# Patient Record
Sex: FEMALE | Race: WHITE | Employment: UNEMPLOYED | URBAN - METROPOLITAN AREA
[De-identification: names, ages, dates, MRNs, and addresses within clinical notes are randomized per-mention and may not be internally consistent; named-entity substitution may affect disease eponyms.]

---

## 2017-01-11 ENCOUNTER — OFFICE VISIT (OUTPATIENT)
Dept: INTERNAL MEDICINE | Age: 72
End: 2017-01-11

## 2017-01-11 VITALS
DIASTOLIC BLOOD PRESSURE: 98 MMHG | BODY MASS INDEX: 25.11 KG/M2 | HEIGHT: 61 IN | SYSTOLIC BLOOD PRESSURE: 160 MMHG | WEIGHT: 133 LBS

## 2017-01-11 DIAGNOSIS — B34.9 VIRAL SYNDROME: ICD-10-CM

## 2017-01-11 DIAGNOSIS — Q60.5 CONGENITAL RENAL AGENESIS AND DYSGENESIS: ICD-10-CM

## 2017-01-11 DIAGNOSIS — Q60.2 CONGENITAL RENAL AGENESIS AND DYSGENESIS: ICD-10-CM

## 2017-01-11 DIAGNOSIS — R10.31 RIGHT LOWER QUADRANT ABDOMINAL PAIN: ICD-10-CM

## 2017-01-11 DIAGNOSIS — G89.29 CHRONIC BILATERAL LOW BACK PAIN WITHOUT SCIATICA: ICD-10-CM

## 2017-01-11 DIAGNOSIS — M54.50 CHRONIC BILATERAL LOW BACK PAIN WITHOUT SCIATICA: ICD-10-CM

## 2017-01-11 PROCEDURE — 99214 OFFICE O/P EST MOD 30 MIN: CPT | Performed by: INTERNAL MEDICINE

## 2017-01-11 ASSESSMENT — ENCOUNTER SYMPTOMS
WHEEZING: 0
COLOR CHANGE: 0
NAUSEA: 0
BACK PAIN: 1
CONSTIPATION: 1
CHEST TIGHTNESS: 0
ABDOMINAL PAIN: 1
VOMITING: 0
DIARRHEA: 1

## 2017-01-31 ENCOUNTER — HOSPITAL ENCOUNTER (OUTPATIENT)
Dept: GENERAL RADIOLOGY | Age: 72
Discharge: HOME | End: 2017-01-31
Attending: INTERNAL MEDICINE | Admitting: INTERNAL MEDICINE

## 2017-01-31 DIAGNOSIS — R53.83 OTHER FATIGUE: ICD-10-CM

## 2017-01-31 DIAGNOSIS — Z13.820 ENCOUNTER FOR IMAGING TO ASSESS OSTEOPOROSIS: ICD-10-CM

## 2017-01-31 DIAGNOSIS — Z12.31 VISIT FOR SCREENING MAMMOGRAM: ICD-10-CM

## 2017-06-02 ENCOUNTER — TELEPHONE (OUTPATIENT)
Dept: INTERNAL MEDICINE | Age: 72
End: 2017-06-02

## 2017-06-07 ENCOUNTER — TELEPHONE (OUTPATIENT)
Dept: INTERNAL MEDICINE | Age: 72
End: 2017-06-07

## 2017-06-07 RX ORDER — METAXALONE 800 MG/1
800 TABLET ORAL 3 TIMES DAILY PRN
Qty: 60 TABLET | Refills: 3 | Status: SHIPPED | OUTPATIENT
Start: 2017-06-07 | End: 2018-03-21 | Stop reason: SDUPTHER

## 2017-06-15 ENCOUNTER — OFFICE VISIT (OUTPATIENT)
Dept: INTERNAL MEDICINE | Age: 72
End: 2017-06-15

## 2017-06-15 VITALS
HEIGHT: 61 IN | DIASTOLIC BLOOD PRESSURE: 80 MMHG | SYSTOLIC BLOOD PRESSURE: 120 MMHG | BODY MASS INDEX: 24.92 KG/M2 | WEIGHT: 132 LBS

## 2017-06-15 DIAGNOSIS — J45.20 MILD INTERMITTENT ASTHMA WITHOUT COMPLICATION: ICD-10-CM

## 2017-06-15 DIAGNOSIS — S72.002G: ICD-10-CM

## 2017-06-15 DIAGNOSIS — Z23 NEED FOR PNEUMOCOCCAL VACCINE: ICD-10-CM

## 2017-06-15 DIAGNOSIS — S72.009D: ICD-10-CM

## 2017-06-15 DIAGNOSIS — M51.9 LUMBAR DISC DISEASE: ICD-10-CM

## 2017-06-15 DIAGNOSIS — C18.2 MALIGNANT NEOPLASM OF ASCENDING COLON (HCC): ICD-10-CM

## 2017-06-15 DIAGNOSIS — M81.0 OSTEOPOROSIS: Primary | ICD-10-CM

## 2017-06-15 PROCEDURE — 99214 OFFICE O/P EST MOD 30 MIN: CPT | Performed by: INTERNAL MEDICINE

## 2017-06-15 PROCEDURE — 1123F ACP DISCUSS/DSCN MKR DOCD: CPT | Performed by: INTERNAL MEDICINE

## 2017-06-15 PROCEDURE — 3014F SCREEN MAMMO DOC REV: CPT | Performed by: INTERNAL MEDICINE

## 2017-06-15 PROCEDURE — G8399 PT W/DXA RESULTS DOCUMENT: HCPCS | Performed by: INTERNAL MEDICINE

## 2017-06-15 PROCEDURE — 4005F PHARM THX FOR OP RXD: CPT | Performed by: INTERNAL MEDICINE

## 2017-06-15 PROCEDURE — 1090F PRES/ABSN URINE INCON ASSESS: CPT | Performed by: INTERNAL MEDICINE

## 2017-06-15 PROCEDURE — G8427 DOCREV CUR MEDS BY ELIG CLIN: HCPCS | Performed by: INTERNAL MEDICINE

## 2017-06-15 PROCEDURE — 1036F TOBACCO NON-USER: CPT | Performed by: INTERNAL MEDICINE

## 2017-06-15 PROCEDURE — G8420 CALC BMI NORM PARAMETERS: HCPCS | Performed by: INTERNAL MEDICINE

## 2017-06-15 PROCEDURE — 90670 PCV13 VACCINE IM: CPT | Performed by: INTERNAL MEDICINE

## 2017-06-15 PROCEDURE — G0009 ADMIN PNEUMOCOCCAL VACCINE: HCPCS | Performed by: INTERNAL MEDICINE

## 2017-06-15 PROCEDURE — 4040F PNEUMOC VAC/ADMIN/RCVD: CPT | Performed by: INTERNAL MEDICINE

## 2017-06-15 PROCEDURE — 3017F COLORECTAL CA SCREEN DOC REV: CPT | Performed by: INTERNAL MEDICINE

## 2017-06-15 ASSESSMENT — PATIENT HEALTH QUESTIONNAIRE - PHQ9
1. LITTLE INTEREST OR PLEASURE IN DOING THINGS: 0
SUM OF ALL RESPONSES TO PHQ9 QUESTIONS 1 & 2: 0
2. FEELING DOWN, DEPRESSED OR HOPELESS: 0
SUM OF ALL RESPONSES TO PHQ QUESTIONS 1-9: 0

## 2017-06-24 PROBLEM — S72.009A CLOSED FRACTURE OF HIP (HCC): Status: ACTIVE | Noted: 2017-06-24

## 2017-06-24 ASSESSMENT — ENCOUNTER SYMPTOMS
BACK PAIN: 0
COLOR CHANGE: 0
CHEST TIGHTNESS: 0
WHEEZING: 0
ABDOMINAL PAIN: 0

## 2017-07-29 PROBLEM — R03.0 ELEVATED BLOOD PRESSURE READING: Status: RESOLVED | Noted: 2017-01-11 | Resolved: 2017-06-24

## 2018-03-21 ENCOUNTER — OFFICE VISIT (OUTPATIENT)
Dept: INTERNAL MEDICINE | Age: 73
End: 2018-03-21

## 2018-03-21 ENCOUNTER — HOSPITAL ENCOUNTER (OUTPATIENT)
Dept: MAMMOGRAPHY | Age: 73
Discharge: HOME | End: 2018-03-21
Attending: INTERNAL MEDICINE | Admitting: INTERNAL MEDICINE

## 2018-03-21 VITALS
SYSTOLIC BLOOD PRESSURE: 150 MMHG | WEIGHT: 137 LBS | HEIGHT: 61 IN | DIASTOLIC BLOOD PRESSURE: 100 MMHG | BODY MASS INDEX: 25.86 KG/M2

## 2018-03-21 DIAGNOSIS — R92.8 ABNORMAL MAMMOGRAM: ICD-10-CM

## 2018-03-21 DIAGNOSIS — Q60.5 CONGENITAL RENAL AGENESIS AND DYSGENESIS: ICD-10-CM

## 2018-03-21 DIAGNOSIS — M51.9 LUMBAR DISC DISEASE: ICD-10-CM

## 2018-03-21 DIAGNOSIS — Z20.6 HIV EXPOSURE: ICD-10-CM

## 2018-03-21 DIAGNOSIS — J45.20 MILD INTERMITTENT ASTHMA WITHOUT COMPLICATION: ICD-10-CM

## 2018-03-21 DIAGNOSIS — E78.00 PURE HYPERCHOLESTEROLEMIA: ICD-10-CM

## 2018-03-21 DIAGNOSIS — G25.0 BENIGN ESSENTIAL TREMOR: ICD-10-CM

## 2018-03-21 DIAGNOSIS — C18.2 MALIGNANT NEOPLASM OF ASCENDING COLON (HCC): ICD-10-CM

## 2018-03-21 DIAGNOSIS — Z00.00 ROUTINE GENERAL MEDICAL EXAMINATION AT A HEALTH CARE FACILITY: ICD-10-CM

## 2018-03-21 DIAGNOSIS — Z00.00 WELL ADULT EXAM: Primary | ICD-10-CM

## 2018-03-21 DIAGNOSIS — L98.9 SKIN LESIONS: ICD-10-CM

## 2018-03-21 DIAGNOSIS — Z12.31 VISIT FOR SCREENING MAMMOGRAM: ICD-10-CM

## 2018-03-21 DIAGNOSIS — C44.90 MALIGNANT NEOPLASM OF SKIN: ICD-10-CM

## 2018-03-21 DIAGNOSIS — Q60.2 CONGENITAL RENAL AGENESIS AND DYSGENESIS: ICD-10-CM

## 2018-03-21 DIAGNOSIS — S72.009D CLOSED FRACTURE OF HIP WITH ROUTINE HEALING, UNSPECIFIED LATERALITY, SUBSEQUENT ENCOUNTER: ICD-10-CM

## 2018-03-21 DIAGNOSIS — R53.82 CHRONIC FATIGUE: ICD-10-CM

## 2018-03-21 DIAGNOSIS — R92.8 ABNORMAL FINDING ON MAMMOGRAPHY: ICD-10-CM

## 2018-03-21 PROBLEM — R10.31 RIGHT LOWER QUADRANT ABDOMINAL PAIN: Status: RESOLVED | Noted: 2017-01-11 | Resolved: 2018-03-21

## 2018-03-21 PROCEDURE — 99214 OFFICE O/P EST MOD 30 MIN: CPT | Performed by: INTERNAL MEDICINE

## 2018-03-21 PROCEDURE — G8484 FLU IMMUNIZE NO ADMIN: HCPCS | Performed by: INTERNAL MEDICINE

## 2018-03-21 PROCEDURE — G8399 PT W/DXA RESULTS DOCUMENT: HCPCS | Performed by: INTERNAL MEDICINE

## 2018-03-21 PROCEDURE — 3017F COLORECTAL CA SCREEN DOC REV: CPT | Performed by: INTERNAL MEDICINE

## 2018-03-21 PROCEDURE — G8419 CALC BMI OUT NRM PARAM NOF/U: HCPCS | Performed by: INTERNAL MEDICINE

## 2018-03-21 PROCEDURE — 1090F PRES/ABSN URINE INCON ASSESS: CPT | Performed by: INTERNAL MEDICINE

## 2018-03-21 PROCEDURE — 1123F ACP DISCUSS/DSCN MKR DOCD: CPT | Performed by: INTERNAL MEDICINE

## 2018-03-21 PROCEDURE — 1036F TOBACCO NON-USER: CPT | Performed by: INTERNAL MEDICINE

## 2018-03-21 PROCEDURE — G8427 DOCREV CUR MEDS BY ELIG CLIN: HCPCS | Performed by: INTERNAL MEDICINE

## 2018-03-21 PROCEDURE — 4040F PNEUMOC VAC/ADMIN/RCVD: CPT | Performed by: INTERNAL MEDICINE

## 2018-03-21 PROCEDURE — 3014F SCREEN MAMMO DOC REV: CPT | Performed by: INTERNAL MEDICINE

## 2018-03-21 PROCEDURE — G0438 PPPS, INITIAL VISIT: HCPCS | Performed by: INTERNAL MEDICINE

## 2018-03-21 RX ORDER — FLUTICASONE PROPIONATE 110 UG/1
AEROSOL, METERED RESPIRATORY (INHALATION)
Qty: 1 INHALER | Refills: 5 | Status: SHIPPED | OUTPATIENT
Start: 2018-03-21 | End: 2019-09-17 | Stop reason: SDUPTHER

## 2018-03-21 RX ORDER — FLUTICASONE PROPIONATE 50 MCG
SPRAY, SUSPENSION (ML) NASAL
Qty: 1 BOTTLE | Refills: 5 | Status: SHIPPED | OUTPATIENT
Start: 2018-03-21 | End: 2019-03-26 | Stop reason: SDUPTHER

## 2018-03-21 RX ORDER — ALBUTEROL SULFATE 90 UG/1
2 AEROSOL, METERED RESPIRATORY (INHALATION) EVERY 6 HOURS PRN
Qty: 1 INHALER | Refills: 3 | Status: SHIPPED | OUTPATIENT
Start: 2018-03-21 | End: 2018-10-09 | Stop reason: SDUPTHER

## 2018-03-21 RX ORDER — METAXALONE 400 MG/1
200 TABLET ORAL 3 TIMES DAILY PRN
COMMUNITY
End: 2018-03-21

## 2018-03-21 RX ORDER — METAXALONE 800 MG/1
800 TABLET ORAL 3 TIMES DAILY PRN
Qty: 30 TABLET | Refills: 5 | Status: SHIPPED | OUTPATIENT
Start: 2018-03-21 | End: 2019-07-25 | Stop reason: SDUPTHER

## 2018-03-21 ASSESSMENT — ENCOUNTER SYMPTOMS
COLOR CHANGE: 0
ABDOMINAL PAIN: 0
WHEEZING: 0
CHEST TIGHTNESS: 0
BACK PAIN: 0

## 2018-03-21 NOTE — PROGRESS NOTES
Subjective:      Patient ID: Odin Latif is a 71 y.o. female. In for physical. Has had some hearing loss in left ear. Still has some anxiety/stress related to financial issues. Hoping to get out and walk once weather better as has gained some weight over the winter. To hopefully get a part time job soon. utd w colon ca MD. still using premarin cream off and on and no recurrent uti's recently. Asthma controlled w current inhalers and not using rescue inhaler. Left hip doing well after fracture w minimal pain. Has slight hand tremor worse today w stress of mammogram incident. Back still bothers her off and on but not doing exercises    Review of Systems   Constitutional: Negative for activity change, appetite change and fatigue. HENT: Positive for hearing loss. Negative for congestion. Eyes: Negative for visual disturbance. Respiratory: Negative for chest tightness and wheezing. Cardiovascular: Negative for chest pain and palpitations. Gastrointestinal: Negative for abdominal pain. Musculoskeletal: Negative for arthralgias and back pain. Skin: Negative for color change and rash. Neurological: Negative for weakness, light-headedness and headaches. Psychiatric/Behavioral: Positive for sleep disturbance. Negative for dysphoric mood. The patient is not nervous/anxious. Objective:   Physical Exam   Constitutional: She is oriented to person, place, and time. She appears well-developed and well-nourished. HENT:   Head: Normocephalic and atraumatic. Right Ear: External ear normal.   Left Ear: External ear normal.   Nose: Nose normal.   Mouth/Throat: Oropharynx is clear and moist.   Eyes: Conjunctivae and EOM are normal. Pupils are equal, round, and reactive to light. Neck: Normal range of motion. Neck supple. No JVD present. Carotid bruit is not present. No thyromegaly present. Cardiovascular: Normal rate, regular rhythm, normal heart sounds, intact distal pulses and normal pulses. Exam reveals no gallop. No murmur heard. No carotid bruit noted bilaterally   Pulmonary/Chest: Effort normal and breath sounds normal. She has no wheezes. She has no rales. Abdominal: Soft. She exhibits no distension and no mass. There is no splenomegaly or hepatomegaly. There is no tenderness. There is no rebound and no guarding. Genitourinary: No breast swelling, tenderness or discharge. Genitourinary Comments: No breast masses palpable   Musculoskeletal: Normal range of motion. Lymphadenopathy:        Head (right side): No submandibular adenopathy present. Head (left side): No submandibular adenopathy present. She has no cervical adenopathy. Neurological: She is alert and oriented to person, place, and time. She has normal strength and normal reflexes. No cranial nerve deficit. Skin: Skin is warm, dry and intact. No rash noted. Psychiatric: She has a normal mood and affect. Her speech is normal and behavior is normal. Judgment and thought content normal. Cognition and memory are normal.         Assessment and Plan:      Malignant neoplasm of ascending colon ([address]  For repeat colonoscopy in August - cont high fiber diet as much as tolerated     Closed fracture of hip ([address]  Cont to do stretches and strengthening     Congenital renal agenesis and dysgenesis  Follow renal function    Asthma  Controlled w inhalers at this time     Benign essential tremor  Tolerating w/out meds for now    Pure hypercholesterolemia  Recheck and watch diet     Malignant neoplasm of skin  Refer to derm for f/u     Lumbar disc disease  Not doing exercises regularly-encouraged to do them regularly    Complete physical completed. Patient now up to date with all routine screening including Pap and colonoscopy if needed, yearly eye and dental exams,labs, dexa if indicated. Counseled re: nutrition/calcium and vit d supplementation,seat belt use, no cell phone use with driving.         Encounter Diagnoses   Name

## 2018-07-03 ENCOUNTER — TELEPHONE (OUTPATIENT)
Dept: INTERNAL MEDICINE | Age: 73
End: 2018-07-03

## 2018-07-03 DIAGNOSIS — F41.9 ANXIETY: Primary | ICD-10-CM

## 2018-07-03 RX ORDER — DIAZEPAM 5 MG/1
5 TABLET ORAL EVERY 6 HOURS PRN
Qty: 20 TABLET | Refills: 0 | Status: SHIPPED | OUTPATIENT
Start: 2018-07-03 | End: 2018-07-10

## 2018-07-03 NOTE — TELEPHONE ENCOUNTER
Patient is calling for valium because she not able to sleep and she gets stomach cramps  Pt is going through a break up   Please call pt advise

## 2018-09-20 ENCOUNTER — HOSPITAL ENCOUNTER (OUTPATIENT)
Dept: ULTRASOUND IMAGING | Age: 73
Discharge: HOME | End: 2018-09-20
Payer: MEDICARE

## 2018-09-20 DIAGNOSIS — Z09 FOLLOW-UP EXAM, 3-6 MONTHS SINCE PREVIOUS EXAM: ICD-10-CM

## 2018-09-20 PROCEDURE — 76642 ULTRASOUND BREAST LIMITED: CPT

## 2018-10-09 ENCOUNTER — TELEPHONE (OUTPATIENT)
Dept: INTERNAL MEDICINE CLINIC | Age: 73
End: 2018-10-09

## 2018-10-09 RX ORDER — ALBUTEROL SULFATE 90 UG/1
2 AEROSOL, METERED RESPIRATORY (INHALATION) EVERY 6 HOURS PRN
Qty: 1 INHALER | Refills: 5 | Status: SHIPPED | OUTPATIENT
Start: 2018-10-09 | End: 2019-09-17 | Stop reason: SDUPTHER

## 2019-03-07 ENCOUNTER — OFFICE VISIT (OUTPATIENT)
Dept: INTERNAL MEDICINE CLINIC | Age: 74
End: 2019-03-07
Payer: MEDICARE

## 2019-03-07 VITALS
TEMPERATURE: 96.4 F | HEIGHT: 61 IN | DIASTOLIC BLOOD PRESSURE: 80 MMHG | WEIGHT: 135 LBS | SYSTOLIC BLOOD PRESSURE: 130 MMHG | BODY MASS INDEX: 25.49 KG/M2

## 2019-03-07 DIAGNOSIS — E55.9 VITAMIN D DEFICIENCY: ICD-10-CM

## 2019-03-07 DIAGNOSIS — J40 BRONCHITIS: ICD-10-CM

## 2019-03-07 DIAGNOSIS — R53.83 FATIGUE, UNSPECIFIED TYPE: ICD-10-CM

## 2019-03-07 DIAGNOSIS — R01.1 CARDIAC MURMUR: ICD-10-CM

## 2019-03-07 DIAGNOSIS — Z20.6 HIV EXPOSURE: ICD-10-CM

## 2019-03-07 DIAGNOSIS — J45.40 MODERATE PERSISTENT ASTHMA WITHOUT COMPLICATION: Primary | ICD-10-CM

## 2019-03-07 DIAGNOSIS — C18.2 MALIGNANT NEOPLASM OF ASCENDING COLON (HCC): ICD-10-CM

## 2019-03-07 DIAGNOSIS — H91.93 HEARING DISORDER OF BOTH EARS: ICD-10-CM

## 2019-03-07 DIAGNOSIS — E78.00 PURE HYPERCHOLESTEROLEMIA: ICD-10-CM

## 2019-03-07 PROCEDURE — G8419 CALC BMI OUT NRM PARAM NOF/U: HCPCS | Performed by: INTERNAL MEDICINE

## 2019-03-07 PROCEDURE — G8399 PT W/DXA RESULTS DOCUMENT: HCPCS | Performed by: INTERNAL MEDICINE

## 2019-03-07 PROCEDURE — 1036F TOBACCO NON-USER: CPT | Performed by: INTERNAL MEDICINE

## 2019-03-07 PROCEDURE — G8484 FLU IMMUNIZE NO ADMIN: HCPCS | Performed by: INTERNAL MEDICINE

## 2019-03-07 PROCEDURE — 1090F PRES/ABSN URINE INCON ASSESS: CPT | Performed by: INTERNAL MEDICINE

## 2019-03-07 PROCEDURE — 3017F COLORECTAL CA SCREEN DOC REV: CPT | Performed by: INTERNAL MEDICINE

## 2019-03-07 PROCEDURE — 1123F ACP DISCUSS/DSCN MKR DOCD: CPT | Performed by: INTERNAL MEDICINE

## 2019-03-07 PROCEDURE — 4040F PNEUMOC VAC/ADMIN/RCVD: CPT | Performed by: INTERNAL MEDICINE

## 2019-03-07 PROCEDURE — 1101F PT FALLS ASSESS-DOCD LE1/YR: CPT | Performed by: INTERNAL MEDICINE

## 2019-03-07 PROCEDURE — G8428 CUR MEDS NOT DOCUMENT: HCPCS | Performed by: INTERNAL MEDICINE

## 2019-03-07 PROCEDURE — 99214 OFFICE O/P EST MOD 30 MIN: CPT | Performed by: INTERNAL MEDICINE

## 2019-03-07 RX ORDER — METHYLPREDNISOLONE 4 MG/1
TABLET ORAL
Qty: 1 KIT | Refills: 0 | Status: SHIPPED | OUTPATIENT
Start: 2019-03-07 | End: 2019-09-17 | Stop reason: CLARIF

## 2019-03-07 RX ORDER — AZITHROMYCIN 250 MG/1
TABLET, FILM COATED ORAL
Qty: 1 PACKET | Refills: 1 | Status: SHIPPED | OUTPATIENT
Start: 2019-03-07 | End: 2019-03-17

## 2019-03-07 ASSESSMENT — ENCOUNTER SYMPTOMS
COUGH: 1
WHEEZING: 0
SHORTNESS OF BREATH: 1
SINUS PAIN: 0
SORE THROAT: 1
CHEST TIGHTNESS: 1
ABDOMINAL PAIN: 0
SINUS PRESSURE: 0

## 2019-03-07 ASSESSMENT — PATIENT HEALTH QUESTIONNAIRE - PHQ9
2. FEELING DOWN, DEPRESSED OR HOPELESS: 0
1. LITTLE INTEREST OR PLEASURE IN DOING THINGS: 1
SUM OF ALL RESPONSES TO PHQ QUESTIONS 1-9: 1
SUM OF ALL RESPONSES TO PHQ9 QUESTIONS 1 & 2: 1
SUM OF ALL RESPONSES TO PHQ QUESTIONS 1-9: 1

## 2019-03-09 ENCOUNTER — TELEPHONE (OUTPATIENT)
Dept: INTERNAL MEDICINE CLINIC | Age: 74
End: 2019-03-09

## 2019-03-09 RX ORDER — DOXYCYCLINE HYCLATE 100 MG
100 TABLET ORAL 2 TIMES DAILY
Qty: 20 TABLET | Refills: 0 | Status: SHIPPED | OUTPATIENT
Start: 2019-03-09 | End: 2019-03-19

## 2019-03-26 RX ORDER — FLUTICASONE PROPIONATE 50 MCG
SPRAY, SUSPENSION (ML) NASAL
Qty: 16 G | Refills: 1 | Status: SHIPPED | OUTPATIENT
Start: 2019-03-26 | End: 2019-09-17 | Stop reason: SDUPTHER

## 2019-07-25 RX ORDER — METAXALONE 800 MG/1
TABLET ORAL
Qty: 30 TABLET | Refills: 5 | Status: SHIPPED | OUTPATIENT
Start: 2019-07-25 | End: 2020-05-01

## 2019-09-17 ENCOUNTER — OFFICE VISIT (OUTPATIENT)
Dept: INTERNAL MEDICINE CLINIC | Age: 74
End: 2019-09-17
Payer: MEDICARE

## 2019-09-17 VITALS
WEIGHT: 136 LBS | DIASTOLIC BLOOD PRESSURE: 82 MMHG | BODY MASS INDEX: 25.68 KG/M2 | SYSTOLIC BLOOD PRESSURE: 132 MMHG | HEIGHT: 61 IN

## 2019-09-17 DIAGNOSIS — Q60.2 CONGENITAL RENAL AGENESIS AND DYSGENESIS: ICD-10-CM

## 2019-09-17 DIAGNOSIS — C18.2 MALIGNANT NEOPLASM OF ASCENDING COLON (HCC): ICD-10-CM

## 2019-09-17 DIAGNOSIS — F33.0 MILD EPISODE OF RECURRENT MAJOR DEPRESSIVE DISORDER (HCC): ICD-10-CM

## 2019-09-17 DIAGNOSIS — R07.9 CHEST PAIN, UNSPECIFIED TYPE: ICD-10-CM

## 2019-09-17 DIAGNOSIS — M81.0 AGE-RELATED OSTEOPOROSIS WITHOUT CURRENT PATHOLOGICAL FRACTURE: ICD-10-CM

## 2019-09-17 DIAGNOSIS — Z00.00 ROUTINE GENERAL MEDICAL EXAMINATION AT A HEALTH CARE FACILITY: Primary | ICD-10-CM

## 2019-09-17 DIAGNOSIS — G25.0 BENIGN ESSENTIAL TREMOR: ICD-10-CM

## 2019-09-17 DIAGNOSIS — Q60.5 CONGENITAL RENAL AGENESIS AND DYSGENESIS: ICD-10-CM

## 2019-09-17 DIAGNOSIS — E78.00 PURE HYPERCHOLESTEROLEMIA: ICD-10-CM

## 2019-09-17 DIAGNOSIS — R53.82 CHRONIC FATIGUE: ICD-10-CM

## 2019-09-17 DIAGNOSIS — J45.20 MILD INTERMITTENT ASTHMA WITHOUT COMPLICATION: ICD-10-CM

## 2019-09-17 DIAGNOSIS — E55.9 VITAMIN D DEFICIENCY: ICD-10-CM

## 2019-09-17 DIAGNOSIS — M25.559 ARTHRALGIA OF HIP, UNSPECIFIED LATERALITY: ICD-10-CM

## 2019-09-17 PROBLEM — F33.9 EPISODE OF RECURRENT MAJOR DEPRESSIVE DISORDER (HCC): Status: ACTIVE | Noted: 2019-09-17

## 2019-09-17 PROBLEM — J40 BRONCHITIS: Status: RESOLVED | Noted: 2019-03-07 | Resolved: 2019-09-17

## 2019-09-17 PROCEDURE — G0439 PPPS, SUBSEQ VISIT: HCPCS | Performed by: INTERNAL MEDICINE

## 2019-09-17 PROCEDURE — 1123F ACP DISCUSS/DSCN MKR DOCD: CPT | Performed by: INTERNAL MEDICINE

## 2019-09-17 PROCEDURE — G8419 CALC BMI OUT NRM PARAM NOF/U: HCPCS | Performed by: INTERNAL MEDICINE

## 2019-09-17 PROCEDURE — 1036F TOBACCO NON-USER: CPT | Performed by: INTERNAL MEDICINE

## 2019-09-17 PROCEDURE — G8427 DOCREV CUR MEDS BY ELIG CLIN: HCPCS | Performed by: INTERNAL MEDICINE

## 2019-09-17 PROCEDURE — 93000 ELECTROCARDIOGRAM COMPLETE: CPT | Performed by: INTERNAL MEDICINE

## 2019-09-17 PROCEDURE — 3017F COLORECTAL CA SCREEN DOC REV: CPT | Performed by: INTERNAL MEDICINE

## 2019-09-17 PROCEDURE — 4040F PNEUMOC VAC/ADMIN/RCVD: CPT | Performed by: INTERNAL MEDICINE

## 2019-09-17 PROCEDURE — 1090F PRES/ABSN URINE INCON ASSESS: CPT | Performed by: INTERNAL MEDICINE

## 2019-09-17 PROCEDURE — G8399 PT W/DXA RESULTS DOCUMENT: HCPCS | Performed by: INTERNAL MEDICINE

## 2019-09-17 PROCEDURE — 99214 OFFICE O/P EST MOD 30 MIN: CPT | Performed by: INTERNAL MEDICINE

## 2019-09-17 RX ORDER — ALBUTEROL SULFATE 90 UG/1
2 AEROSOL, METERED RESPIRATORY (INHALATION) EVERY 6 HOURS PRN
Qty: 1 INHALER | Refills: 5 | Status: SHIPPED | OUTPATIENT
Start: 2019-09-17 | End: 2021-03-10 | Stop reason: SDUPTHER

## 2019-09-17 RX ORDER — FLUTICASONE PROPIONATE 110 UG/1
AEROSOL, METERED RESPIRATORY (INHALATION)
Qty: 1 INHALER | Refills: 5 | Status: SHIPPED | OUTPATIENT
Start: 2019-09-17 | End: 2021-03-10 | Stop reason: SDUPTHER

## 2019-09-17 RX ORDER — FLUTICASONE PROPIONATE 50 MCG
SPRAY, SUSPENSION (ML) NASAL
Qty: 16 G | Refills: 5 | Status: SHIPPED | OUTPATIENT
Start: 2019-09-17 | End: 2021-03-10 | Stop reason: SDUPTHER

## 2019-09-17 ASSESSMENT — PATIENT HEALTH QUESTIONNAIRE - PHQ9
SUM OF ALL RESPONSES TO PHQ QUESTIONS 1-9: 0

## 2019-09-17 ASSESSMENT — ENCOUNTER SYMPTOMS
BACK PAIN: 0
COLOR CHANGE: 0
ABDOMINAL PAIN: 0
CHEST TIGHTNESS: 0
WHEEZING: 0

## 2019-09-17 ASSESSMENT — LIFESTYLE VARIABLES
HOW OFTEN DO YOU HAVE SIX OR MORE DRINKS ON ONE OCCASION: 0
HOW OFTEN DO YOU HAVE A DRINK CONTAINING ALCOHOL: 3
HOW MANY STANDARD DRINKS CONTAINING ALCOHOL DO YOU HAVE ON A TYPICAL DAY: 0

## 2019-09-17 NOTE — ASSESSMENT & PLAN NOTE
Check renal function-Need to control all risk factors- monitor blood pressure, blood sugars , and lipids carefully and treat aggressively to avoid progression of renal disease

## 2019-09-17 NOTE — PROGRESS NOTES
Medicare Annual Wellness Visit  Name: Ruth Gomez Date: 2019   MRN: [id number] Sex: Female   Age: 69 y.o. Ethnicity: Non-/Non    : 1945 Race: Ira Roque is here for No chief complaint on file. Screenings for behavioral, psychosocial and functional/safety risks, and cognitive dysfunction are all negative except as indicated below. These results, as well as other patient data from the [address] form, are documented in Flowsheets linked to this Encounter. Allergies   Allergen Reactions    Dye [Iodides] Anaphylaxis    Iodine Hives, Shortness Of Breath and Itching    Azithromycin      intolerance    Codeine Nausea And Vomiting     Headache severe     Prior to Visit Medications    Medication Sig Taking? Authorizing Provider   albuterol sulfate HFA (PROAIR HFA) 108 (90 Base) MCG/ACT inhaler Inhale 2 puffs into the lungs every 6 hours as needed for Wheezing Yes Josselyn Dennis MD   fluticasone (FLOVENT HFA) 110 MCG/ACT inhaler INHALE 2 PUFFS TWICE A DAY AS DIRECTED Yes Josselyn Dennis MD   fluticasone (FLONASE) 50 MCG/ACT nasal spray One spray each nostril daily Yes Josselyn Dennis MD   metaxalone (SKELAXIN) 800 MG tablet TAKE 1 TABLET BY MOUTH THREE TIMES DAILY AS NEEDED FOR PAIN Yes Josselyn Dennis MD   conjugated estrogens (PREMARIN) 0.625 MG/GM vaginal cream Place 0.5 g vaginally daily Place vaginally daily. Yes Josselyn Dennis MD   Cholecalciferol (VITAMIN D3) 1000 UNITS CAPS Take 1 capsule by mouth daily Yes Historical Provider, MD   vitamin B-12 (CYANOCOBALAMIN) 500 MCG tablet Take 500 mcg by mouth daily Yes Historical Provider, MD   Multiple Vitamins-Minerals (THERAPEUTIC MULTIVITAMIN-MINERALS) tablet Take 1 tablet by mouth daily. Yes Historical Provider, MD   magnesium gluconate (MAGONATE) 500 MG tablet Take 250 mg by mouth once  Yes Historical Provider, MD   Biotin 1 MG CAPS Take 2 tablets by mouth daily.  Yes Historical Provider, MD   Cranberry 125 kg/m². Based upon direct observation of the patient, evaluation of cognition reveals recent and remote memory intact. Patient's complete Health Risk Assessment and screening values have been reviewed and are found in Flowsheets. The following problems were reviewed today and where indicated follow up appointments were made and/or referrals ordered. Positive Risk Factor Screenings with Interventions:     No Positive Risk Factors identified today. Personalized Preventive Plan   Current Health Maintenance Status  Immunization History   Administered Date(s) Administered    Pneumococcal Conjugate 13-valent ([id number]) 06/15/2017    Pneumococcal Polysaccharide ([id number]) 04/09/2012    Tetanus 05/09/2006        Health Maintenance   Topic Date Due    Hepatitis C screen  1945    Shingles Vaccine (1 of 2) 07/04/1996    DTaP/Tdap/Td vaccine (2 - Tdap) 05/11/2016    Annual Wellness Visit (AWV)  05/29/2019    Flu vaccine (1) 10/31/2019 (Originally 9/1/2019)    Breast cancer screen  03/21/2020    Lipid screen  12/13/2021    Colon cancer screen colonoscopy  10/19/2028    DEXA (modify frequency per FRAX score)  Completed    Pneumococcal 65+ years Vaccine  Completed     Recommendations for Preventive Services Due: see orders and patient instructions/AVS.  . Recommended screening schedule for the next 5-10 years is provided to the patient in written form: see Patient Instructions/AVS.    Diagnoses and all orders for this visit:    Routine general medical examination at a health care facility  -     HEPATITIS C ANTIBODY;  Future  -     EKG 12 Lead    Mild intermittent asthma without complication  -     fluticasone (FLOVENT HFA) 110 MCG/ACT inhaler; INHALE 2 PUFFS TWICE A DAY AS DIRECTED    Pure hypercholesterolemia  -     fluticasone (FLOVENT HFA) 110 MCG/ACT inhaler; INHALE 2 PUFFS TWICE A DAY AS DIRECTED    Benign essential tremor  -     fluticasone (FLOVENT HFA) 110 MCG/ACT inhaler; INHALE 2

## 2019-09-30 ENCOUNTER — OFFICE VISIT (OUTPATIENT)
Dept: PSYCHOLOGY | Age: 74
End: 2019-09-30
Payer: MEDICARE

## 2019-09-30 DIAGNOSIS — F32.A DEPRESSION, UNSPECIFIED DEPRESSION TYPE: Primary | ICD-10-CM

## 2019-09-30 PROCEDURE — 90791 PSYCH DIAGNOSTIC EVALUATION: CPT | Performed by: PSYCHOLOGIST

## 2019-09-30 ASSESSMENT — PATIENT HEALTH QUESTIONNAIRE - PHQ9
SUM OF ALL RESPONSES TO PHQ9 QUESTIONS 1 & 2: 2
SUM OF ALL RESPONSES TO PHQ QUESTIONS 1-9: 2
2. FEELING DOWN, DEPRESSED OR HOPELESS: 1
1. LITTLE INTEREST OR PLEASURE IN DOING THINGS: 1
SUM OF ALL RESPONSES TO PHQ QUESTIONS 1-9: 2

## 2019-09-30 ASSESSMENT — ANXIETY QUESTIONNAIRES
2. NOT BEING ABLE TO STOP OR CONTROL WORRYING: 0-NOT AT ALL SURE
3. WORRYING TOO MUCH ABOUT DIFFERENT THINGS: 0-NOT AT ALL SURE
7. FEELING AFRAID AS IF SOMETHING AWFUL MIGHT HAPPEN: 0-NOT AT ALL SURE
1. FEELING NERVOUS, ANXIOUS, OR ON EDGE: 1-SEVERAL DAYS
5. BEING SO RESTLESS THAT IT IS HARD TO SIT STILL: 0-NOT AT ALL SURE
4. TROUBLE RELAXING: 0-NOT AT ALL SURE
6. BECOMING EASILY ANNOYED OR IRRITABLE: 0-NOT AT ALL SURE
GAD7 TOTAL SCORE: 1

## 2019-10-17 ENCOUNTER — OFFICE VISIT (OUTPATIENT)
Dept: PSYCHOLOGY | Age: 74
End: 2019-10-17
Payer: MEDICARE

## 2019-10-17 DIAGNOSIS — F32.A DEPRESSION, UNSPECIFIED DEPRESSION TYPE: Primary | ICD-10-CM

## 2019-10-17 PROCEDURE — 90832 PSYTX W PT 30 MINUTES: CPT | Performed by: PSYCHOLOGIST

## 2019-10-17 ASSESSMENT — ANXIETY QUESTIONNAIRES
5. BEING SO RESTLESS THAT IT IS HARD TO SIT STILL: 0-NOT AT ALL SURE
2. NOT BEING ABLE TO STOP OR CONTROL WORRYING: 0-NOT AT ALL SURE
4. TROUBLE RELAXING: 0-NOT AT ALL SURE
7. FEELING AFRAID AS IF SOMETHING AWFUL MIGHT HAPPEN: 0-NOT AT ALL SURE
GAD7 TOTAL SCORE: 0
1. FEELING NERVOUS, ANXIOUS, OR ON EDGE: 0-NOT AT ALL SURE
6. BECOMING EASILY ANNOYED OR IRRITABLE: 0-NOT AT ALL SURE
3. WORRYING TOO MUCH ABOUT DIFFERENT THINGS: 0-NOT AT ALL SURE

## 2019-10-17 ASSESSMENT — PATIENT HEALTH QUESTIONNAIRE - PHQ9
2. FEELING DOWN, DEPRESSED OR HOPELESS: 0
SUM OF ALL RESPONSES TO PHQ QUESTIONS 1-9: 0
SUM OF ALL RESPONSES TO PHQ QUESTIONS 1-9: 0
1. LITTLE INTEREST OR PLEASURE IN DOING THINGS: 0
SUM OF ALL RESPONSES TO PHQ9 QUESTIONS 1 & 2: 0

## 2019-12-10 ENCOUNTER — HOSPITAL ENCOUNTER (OUTPATIENT)
Dept: GENERAL RADIOLOGY | Age: 74
Discharge: HOME | End: 2019-12-10
Payer: MEDICARE

## 2019-12-10 DIAGNOSIS — M81.0 AGE-RELATED OSTEOPOROSIS WITHOUT CURRENT PATHOLOGICAL FRACTURE: ICD-10-CM

## 2019-12-10 PROCEDURE — 77080 DXA BONE DENSITY AXIAL: CPT

## 2019-12-17 ENCOUNTER — TELEPHONE (OUTPATIENT)
Dept: ENDOCRINOLOGY | Age: 74
End: 2019-12-17

## 2019-12-17 ENCOUNTER — OFFICE VISIT (OUTPATIENT)
Dept: ENDOCRINOLOGY | Age: 74
End: 2019-12-17
Payer: MEDICARE

## 2019-12-17 VITALS
OXYGEN SATURATION: 98 % | DIASTOLIC BLOOD PRESSURE: 94 MMHG | HEART RATE: 100 BPM | HEIGHT: 61 IN | WEIGHT: 137.8 LBS | SYSTOLIC BLOOD PRESSURE: 165 MMHG | BODY MASS INDEX: 26.01 KG/M2

## 2019-12-17 DIAGNOSIS — I10 HYPERTENSION, UNSPECIFIED TYPE: ICD-10-CM

## 2019-12-17 DIAGNOSIS — M81.0 AGE-RELATED OSTEOPOROSIS WITHOUT CURRENT PATHOLOGICAL FRACTURE: Primary | ICD-10-CM

## 2019-12-17 DIAGNOSIS — E55.9 VITAMIN D DEFICIENCY: ICD-10-CM

## 2019-12-17 PROCEDURE — G8417 CALC BMI ABV UP PARAM F/U: HCPCS | Performed by: INTERNAL MEDICINE

## 2019-12-17 PROCEDURE — G8427 DOCREV CUR MEDS BY ELIG CLIN: HCPCS | Performed by: INTERNAL MEDICINE

## 2019-12-17 PROCEDURE — G8399 PT W/DXA RESULTS DOCUMENT: HCPCS | Performed by: INTERNAL MEDICINE

## 2019-12-17 PROCEDURE — 1036F TOBACCO NON-USER: CPT | Performed by: INTERNAL MEDICINE

## 2019-12-17 PROCEDURE — 1090F PRES/ABSN URINE INCON ASSESS: CPT | Performed by: INTERNAL MEDICINE

## 2019-12-17 PROCEDURE — 99204 OFFICE O/P NEW MOD 45 MIN: CPT | Performed by: INTERNAL MEDICINE

## 2019-12-17 PROCEDURE — G8484 FLU IMMUNIZE NO ADMIN: HCPCS | Performed by: INTERNAL MEDICINE

## 2019-12-17 PROCEDURE — 4040F PNEUMOC VAC/ADMIN/RCVD: CPT | Performed by: INTERNAL MEDICINE

## 2019-12-17 PROCEDURE — 1123F ACP DISCUSS/DSCN MKR DOCD: CPT | Performed by: INTERNAL MEDICINE

## 2019-12-17 PROCEDURE — 3017F COLORECTAL CA SCREEN DOC REV: CPT | Performed by: INTERNAL MEDICINE

## 2019-12-17 RX ORDER — RISEDRONATE SODIUM 35 MG/1
35 TABLET, FILM COATED ORAL
Qty: 4 TABLET | Refills: 5 | Status: SHIPPED | OUTPATIENT
Start: 2019-12-17 | End: 2019-12-31

## 2019-12-20 DIAGNOSIS — M81.0 AGE-RELATED OSTEOPOROSIS WITHOUT CURRENT PATHOLOGICAL FRACTURE: ICD-10-CM

## 2019-12-20 DIAGNOSIS — E55.9 VITAMIN D DEFICIENCY: ICD-10-CM

## 2019-12-20 LAB
24HR URINE VOLUME (ML): 2050 ML
CALCIUM 24 HOUR URINE: 148 MG/24 HR (ref 42–353)
CREATININE 24 HOUR URINE: 0.7 G/24HR (ref 0.6–1.5)

## 2019-12-26 ENCOUNTER — TELEPHONE (OUTPATIENT)
Dept: ENDOCRINOLOGY | Age: 74
End: 2019-12-26

## 2019-12-26 NOTE — TELEPHONE ENCOUNTER
Can we do a  PA? She did not tolerate fosamax.   If not, Dr. Jim Renae discussed prolia with patient and can advise to start prolia

## 2019-12-30 DIAGNOSIS — C18.2 MALIGNANT NEOPLASM OF ASCENDING COLON (HCC): ICD-10-CM

## 2019-12-30 DIAGNOSIS — R53.83 FATIGUE, UNSPECIFIED TYPE: ICD-10-CM

## 2019-12-30 DIAGNOSIS — J45.40 MODERATE PERSISTENT ASTHMA WITHOUT COMPLICATION: ICD-10-CM

## 2019-12-30 DIAGNOSIS — M81.0 AGE-RELATED OSTEOPOROSIS WITHOUT CURRENT PATHOLOGICAL FRACTURE: ICD-10-CM

## 2019-12-30 DIAGNOSIS — H91.93 HEARING DISORDER OF BOTH EARS: ICD-10-CM

## 2019-12-30 DIAGNOSIS — Z20.6 HIV EXPOSURE: ICD-10-CM

## 2019-12-30 DIAGNOSIS — E78.00 PURE HYPERCHOLESTEROLEMIA: ICD-10-CM

## 2019-12-30 DIAGNOSIS — E55.9 VITAMIN D DEFICIENCY: ICD-10-CM

## 2019-12-30 DIAGNOSIS — J40 BRONCHITIS: ICD-10-CM

## 2019-12-30 DIAGNOSIS — R01.1 CARDIAC MURMUR: ICD-10-CM

## 2019-12-30 LAB
A/G RATIO: 1.7 (ref 1.1–2.2)
ALBUMIN SERPL-MCNC: 4.4 G/DL (ref 3.4–5)
ALP BLD-CCNC: 38 U/L (ref 40–129)
ALT SERPL-CCNC: 13 U/L (ref 10–40)
ANION GAP SERPL CALCULATED.3IONS-SCNC: 21 MMOL/L (ref 3–16)
AST SERPL-CCNC: 18 U/L (ref 15–37)
BASOPHILS ABSOLUTE: 0.1 K/UL (ref 0–0.2)
BASOPHILS RELATIVE PERCENT: 0.7 %
BILIRUB SERPL-MCNC: 0.4 MG/DL (ref 0–1)
BUN BLDV-MCNC: 12 MG/DL (ref 7–20)
CALCIUM SERPL-MCNC: 9.7 MG/DL (ref 8.3–10.6)
CEA: 3.1 NG/ML (ref 0–5)
CHLORIDE BLD-SCNC: 102 MMOL/L (ref 99–110)
CHOLESTEROL, TOTAL: 225 MG/DL (ref 0–199)
CO2: 23 MMOL/L (ref 21–32)
CREAT SERPL-MCNC: 0.8 MG/DL (ref 0.6–1.2)
EOSINOPHILS ABSOLUTE: 0.2 K/UL (ref 0–0.6)
EOSINOPHILS RELATIVE PERCENT: 1.8 %
GFR AFRICAN AMERICAN: >60
GFR NON-AFRICAN AMERICAN: >60
GLOBULIN: 2.6 G/DL
GLUCOSE BLD-MCNC: 99 MG/DL (ref 70–99)
HCT VFR BLD CALC: 43.9 % (ref 36–48)
HDLC SERPL-MCNC: 93 MG/DL (ref 40–60)
HEMOGLOBIN: 14.4 G/DL (ref 12–16)
LDL CHOLESTEROL CALCULATED: 112 MG/DL
LYMPHOCYTES ABSOLUTE: 2.6 K/UL (ref 1–5.1)
LYMPHOCYTES RELATIVE PERCENT: 28.1 %
MCH RBC QN AUTO: 29.7 PG (ref 26–34)
MCHC RBC AUTO-ENTMCNC: 32.7 G/DL (ref 31–36)
MCV RBC AUTO: 90.8 FL (ref 80–100)
MONOCYTES ABSOLUTE: 0.9 K/UL (ref 0–1.3)
MONOCYTES RELATIVE PERCENT: 9.5 %
NEUTROPHILS ABSOLUTE: 5.6 K/UL (ref 1.7–7.7)
NEUTROPHILS RELATIVE PERCENT: 59.9 %
PDW BLD-RTO: 14.6 % (ref 12.4–15.4)
PHOSPHORUS: 3.3 MG/DL (ref 2.5–4.9)
PLATELET # BLD: 402 K/UL (ref 135–450)
PMV BLD AUTO: 7.2 FL (ref 5–10.5)
POTASSIUM SERPL-SCNC: 3.9 MMOL/L (ref 3.5–5.1)
RBC # BLD: 4.84 M/UL (ref 4–5.2)
SODIUM BLD-SCNC: 146 MMOL/L (ref 136–145)
TOTAL PROTEIN: 7 G/DL (ref 6.4–8.2)
TRIGL SERPL-MCNC: 98 MG/DL (ref 0–150)
TSH SERPL DL<=0.05 MIU/L-ACNC: 2.34 UIU/ML (ref 0.27–4.2)
VITAMIN D 25-HYDROXY: 51.1 NG/ML
VLDLC SERPL CALC-MCNC: 20 MG/DL
WBC # BLD: 9.3 K/UL (ref 4–11)

## 2019-12-30 NOTE — TELEPHONE ENCOUNTER
Please advise patient it would be better to start Prolia as advised previously by Dr. Samuel Chacko. If she is not willing, I will then send Ibandronate.

## 2019-12-30 NOTE — TELEPHONE ENCOUNTER
I apologize as I am a little behind but the risedronate was denied and I did put on the PA the patient did not tolerate alendronate.  Insurance says the patient must try ibandronate first.

## 2019-12-31 LAB
HIV AG/AB: NORMAL
HIV ANTIGEN: NORMAL
HIV-1 ANTIBODY: NORMAL
HIV-2 AB: NORMAL

## 2019-12-31 RX ORDER — IBANDRONATE SODIUM 150 MG/1
TABLET, FILM COATED ORAL
Qty: 4 TABLET | Refills: 0 | Status: SHIPPED | OUTPATIENT
Start: 2019-12-31 | End: 2020-05-01 | Stop reason: SDUPTHER

## 2020-03-25 ENCOUNTER — TELEPHONE (OUTPATIENT)
Dept: INTERNAL MEDICINE CLINIC | Age: 75
End: 2020-03-25

## 2020-03-25 NOTE — TELEPHONE ENCOUNTER
Since he is not on her HIPPA can we say anything??? Doubt she can see one any time soon w the current situation

## 2020-05-01 ENCOUNTER — VIRTUAL VISIT (OUTPATIENT)
Dept: INTERNAL MEDICINE CLINIC | Age: 75
End: 2020-05-01
Payer: MEDICARE

## 2020-05-01 VITALS
WEIGHT: 135 LBS | SYSTOLIC BLOOD PRESSURE: 141 MMHG | DIASTOLIC BLOOD PRESSURE: 85 MMHG | HEIGHT: 63 IN | BODY MASS INDEX: 23.92 KG/M2

## 2020-05-01 PROBLEM — R41.3 MEMORY LOSS: Status: ACTIVE | Noted: 2020-05-01

## 2020-05-01 PROCEDURE — 4040F PNEUMOC VAC/ADMIN/RCVD: CPT | Performed by: INTERNAL MEDICINE

## 2020-05-01 PROCEDURE — 1090F PRES/ABSN URINE INCON ASSESS: CPT | Performed by: INTERNAL MEDICINE

## 2020-05-01 PROCEDURE — 1123F ACP DISCUSS/DSCN MKR DOCD: CPT | Performed by: INTERNAL MEDICINE

## 2020-05-01 PROCEDURE — G8399 PT W/DXA RESULTS DOCUMENT: HCPCS | Performed by: INTERNAL MEDICINE

## 2020-05-01 PROCEDURE — 99214 OFFICE O/P EST MOD 30 MIN: CPT | Performed by: INTERNAL MEDICINE

## 2020-05-01 PROCEDURE — 3017F COLORECTAL CA SCREEN DOC REV: CPT | Performed by: INTERNAL MEDICINE

## 2020-05-01 PROCEDURE — G8427 DOCREV CUR MEDS BY ELIG CLIN: HCPCS | Performed by: INTERNAL MEDICINE

## 2020-05-01 RX ORDER — IBANDRONATE SODIUM 150 MG/1
TABLET, FILM COATED ORAL
Qty: 4 TABLET | Refills: 5 | Status: SHIPPED | OUTPATIENT
Start: 2020-05-01 | End: 2020-07-29

## 2020-05-02 NOTE — ASSESSMENT & PLAN NOTE
Will check mri to r/o pathology but needs neuropsych testing and neurology eval-discussed w daughter and will send info to her

## 2020-05-02 NOTE — PROGRESS NOTES
2020    TELEHEALTH EVALUATION -- Audio/Visual (During AXKXA-90 public health emergency)    HPI:    Paulo Drake (:  1945) has requested an audio/video evaluation for the following concern(s):    Having severe memory issues- family concerned- sleeping ok and appetite is good- denies any focal neurological issues- has not been a danger to herself- looses her train of thought- daughters say she also has trouble remembering some important things and she covers up her memory loss-forgets daily interactions- having a great deal of anxiety especially w the COVID crisis- never was able to take any of the meds for her osteoporosis although tried to work it out w endo felt boniva they finally prescribed it -still having some arthritis pain-especially in back-took occasional skelaxin for this in past-taking her vit d regularly  Review of Systems   Constitutional: Negative for activity change, appetite change and fatigue. HENT: Negative for congestion. Eyes: Negative for visual disturbance. Respiratory: Negative for chest tightness and wheezing. Cardiovascular: Negative for chest pain and palpitations. Gastrointestinal: Negative for abdominal pain. Musculoskeletal: Positive for arthralgias. Negative for back pain. Skin: Negative for color change and rash. Neurological: Negative for weakness, light-headedness and headaches. Psychiatric/Behavioral: Positive for decreased concentration, dysphoric mood and sleep disturbance. The patient is nervous/anxious. Prior to Visit Medications    Medication Sig Taking?  Authorizing Provider   ibandronate (BONIVA) 150 MG tablet Take one (1) tablet once per month in the morning with a full glass of water, on an empty stomach Yes Benigno Jackman MD   albuterol sulfate HFA (PROAIR HFA) 108 (90 Base) MCG/ACT inhaler Inhale 2 puffs into the lungs every 6 hours as needed for Wheezing Yes Benigno Jackman MD   fluticasone (FLONASE) 50 MCG/ACT nasal spray One spray caregiver was present when appropriate. Due to this being a TeleHealth encounter (During XXWHA-33 public health emergency), evaluation of the following organ systems was limited: Vitals/Constitutional/EENT/Resp/CV/GI//MS/Neuro/Skin/Heme-Lymph-Imm. Pursuant to the emergency declaration under the [address] [place] [address], [address] [place] [address] and the [employer] and [employer] Act, this Virtual Visit was conducted with patient's (and/or legal guardian's) consent, to reduce the patient's risk of exposure to COVID-19 and provide necessary medical care. The patient (and/or legal guardian) has also been advised to contact this office for worsening conditions or problems, and seek emergency medical treatment and/or call 911 if deemed necessary. Patient identification was verified at the start of the visit: Yes    Total time spent on this encounter: Not billed by time    Services were provided through a video synchronous discussion virtually to substitute for in-person clinic visit. Patient and provider were located at their individual homes. --Conor Mitchell MD on 5/1/2020 at 9:46 PM    An electronic signature was used to authenticate this note.

## 2020-06-04 ENCOUNTER — TELEPHONE (OUTPATIENT)
Dept: INTERNAL MEDICINE CLINIC | Age: 75
End: 2020-06-04

## 2020-06-04 RX ORDER — GALANTAMINE HYDROBROMIDE 4 MG/1
4 TABLET, FILM COATED ORAL 2 TIMES DAILY
Qty: 60 TABLET | Refills: 3 | Status: SHIPPED | OUTPATIENT
Start: 2020-06-04 | End: 2020-06-17 | Stop reason: DRUGHIGH

## 2020-06-17 ENCOUNTER — TELEPHONE (OUTPATIENT)
Dept: INTERNAL MEDICINE CLINIC | Age: 75
End: 2020-06-17

## 2020-06-17 RX ORDER — GALANTAMINE HYDROBROMIDE 8 MG/1
8 TABLET, FILM COATED ORAL 2 TIMES DAILY
Qty: 60 TABLET | Refills: 3 | Status: SHIPPED | OUTPATIENT
Start: 2020-06-17 | End: 2020-06-18 | Stop reason: SDUPTHER

## 2020-06-18 RX ORDER — GALANTAMINE HYDROBROMIDE 8 MG/1
8 TABLET, FILM COATED ORAL 2 TIMES DAILY
Qty: 60 TABLET | Refills: 3 | Status: SHIPPED | OUTPATIENT
Start: 2020-06-18 | End: 2020-09-22 | Stop reason: SDUPTHER

## 2020-07-29 ENCOUNTER — VIRTUAL VISIT (OUTPATIENT)
Dept: INTERNAL MEDICINE CLINIC | Age: 75
End: 2020-07-29
Payer: MEDICARE

## 2020-07-29 PROBLEM — F02.80 ALZHEIMER'S DISEASE (HCC): Status: ACTIVE | Noted: 2020-05-01

## 2020-07-29 PROBLEM — G30.9 ALZHEIMER'S DISEASE (HCC): Status: ACTIVE | Noted: 2020-05-01

## 2020-07-29 PROBLEM — R26.9 GAIT DISTURBANCE: Status: ACTIVE | Noted: 2020-07-29

## 2020-07-29 PROCEDURE — G8427 DOCREV CUR MEDS BY ELIG CLIN: HCPCS | Performed by: INTERNAL MEDICINE

## 2020-07-29 PROCEDURE — G8399 PT W/DXA RESULTS DOCUMENT: HCPCS | Performed by: INTERNAL MEDICINE

## 2020-07-29 PROCEDURE — 4040F PNEUMOC VAC/ADMIN/RCVD: CPT | Performed by: INTERNAL MEDICINE

## 2020-07-29 PROCEDURE — 1090F PRES/ABSN URINE INCON ASSESS: CPT | Performed by: INTERNAL MEDICINE

## 2020-07-29 PROCEDURE — G8420 CALC BMI NORM PARAMETERS: HCPCS | Performed by: INTERNAL MEDICINE

## 2020-07-29 PROCEDURE — 1123F ACP DISCUSS/DSCN MKR DOCD: CPT | Performed by: INTERNAL MEDICINE

## 2020-07-29 PROCEDURE — 99214 OFFICE O/P EST MOD 30 MIN: CPT | Performed by: INTERNAL MEDICINE

## 2020-07-29 PROCEDURE — 1036F TOBACCO NON-USER: CPT | Performed by: INTERNAL MEDICINE

## 2020-07-29 PROCEDURE — 3017F COLORECTAL CA SCREEN DOC REV: CPT | Performed by: INTERNAL MEDICINE

## 2020-07-29 RX ORDER — ALENDRONATE SODIUM 70 MG/1
70 TABLET ORAL
Qty: 4 TABLET | Refills: 3 | Status: SHIPPED | OUTPATIENT
Start: 2020-07-29 | End: 2020-09-22 | Stop reason: SDUPTHER

## 2020-07-29 ASSESSMENT — PATIENT HEALTH QUESTIONNAIRE - PHQ9
SUM OF ALL RESPONSES TO PHQ QUESTIONS 1-9: 1
2. FEELING DOWN, DEPRESSED OR HOPELESS: 0
1. LITTLE INTEREST OR PLEASURE IN DOING THINGS: 1
SUM OF ALL RESPONSES TO PHQ9 QUESTIONS 1 & 2: 1
SUM OF ALL RESPONSES TO PHQ QUESTIONS 1-9: 1

## 2020-07-29 ASSESSMENT — ENCOUNTER SYMPTOMS
BACK PAIN: 0
ABDOMINAL PAIN: 0
CHEST TIGHTNESS: 0
WHEEZING: 0
COLOR CHANGE: 0

## 2020-07-29 NOTE — PROGRESS NOTES
2020    TELEHEALTH EVALUATION -- Audio/Visual (During FOTQO-98 public health emergency)    HPI:    Beth Marley (:  1945) has requested an audio/video evaluation for the following concern(s):    Pt visit w all of her children-ha been having a lot of issues w her dementia and recent severe decline- Seen by neuropsych and told she is in early stages of dementia/alzheimers- low back really got a lot worse last week- had been unable to get out of bed due to pain and wanted to go to the ER- given some skelaxin and it helped- also took ativan and it helped a great deal as she had been very panicked- not walking well due to the pain and imbalance is worse- short term memory seems to have declined also a great deal in the past few weeks- did see oncologist for f/u of colon ca recently     Review of Systems   Constitutional: Negative for activity change, appetite change and fatigue. HENT: Negative for congestion. Eyes: Negative for visual disturbance. Respiratory: Negative for chest tightness and wheezing. Cardiovascular: Negative for chest pain and palpitations. Gastrointestinal: Negative for abdominal pain. Musculoskeletal: Negative for arthralgias and back pain. Skin: Negative for color change and rash. Neurological: Negative for weakness, light-headedness and headaches. Psychiatric/Behavioral: Positive for dysphoric mood and sleep disturbance. The patient is nervous/anxious. Prior to Visit Medications    Medication Sig Taking?  Authorizing Provider   alendronate (FOSAMAX) 70 MG tablet Take 1 tablet by mouth every 7 days Yes Haroldo Clifton MD   galantamine (RAZADYNE) 8 MG tablet Take 1 tablet by mouth 2 times daily Yes Haroldo Clifton MD   albuterol sulfate HFA (PROAIR HFA) 108 (90 Base) MCG/ACT inhaler Inhale 2 puffs into the lungs every 6 hours as needed for Wheezing Yes Haroldo Clifton MD   fluticasone (FLOVENT HFA) 110 MCG/ACT inhaler INHALE 2 PUFFS TWICE A DAY AS DIRECTED Yes Kellen Desi Colón MD   fluticasone (FLONASE) 50 MCG/ACT nasal spray One spray each nostril daily Yes Yunior Rice MD   Cholecalciferol (VITAMIN D3) 1000 UNITS CAPS Take 1 capsule by mouth daily Yes Historical Provider, MD   vitamin B-12 (CYANOCOBALAMIN) 500 MCG tablet Take 500 mcg by mouth daily Yes Historical Provider, MD   Multiple Vitamins-Minerals (THERAPEUTIC MULTIVITAMIN-MINERALS) tablet Take 1 tablet by mouth daily. Yes Historical Provider, MD   Biotin 1 MG CAPS Take 2 tablets by mouth daily. Yes Historical Provider, MD   Cranberry 125 MG TABS Take 1 tablet by mouth. Yes Historical Provider, MD   loratadine (CLARITIN) 10 MG tablet Take 10 mg by mouth daily as needed  Yes Yunior Rice MD   albuterol (PROVENTIL HFA) 108 (90 BASE) MCG/ACT inhaler Inhale 2 puffs into the lungs every 6 hours as needed for Wheezing. Yunior Rice MD       Social History     Tobacco Use    Smoking status: Former Smoker     Packs/day: 0.50     Years: 15.00     Pack years: 7.50     Types: Cigarettes     Last attempt to quit: 1981     Years since quittin.2    Smokeless tobacco: Never Used   Substance Use Topics    Alcohol use: No     Alcohol/week: 0.0 standard drinks    Drug use: No        Past Medical History:   Diagnosis Date    Allergic     Allergic rhinitis, cause unspecified     Anemia     Asymptomatic varicose veins     Benign neoplasm of skin, site unspecified     Cancer (HCC)     Congenital renal agenesis and dysgenesis     Edema     GERD (gastroesophageal reflux disease)     Osteopetrosis     Pain in limb     Symptomatic menopausal or female climacteric states     Tobacco use disorder     Unspecified asthma(493.90)     allergy induced    Unspecified essential hypertension        PHYSICAL EXAMINATION:  There were no vitals filed for this visit.      Constitutional: [x] Appears well-developed and well-nourished [x] No apparent distress      [] Abnormal-   Mental status  [x] Alert and awake  [x] Oriented to person/place/time [x]Able to follow commands   Affect slightly flattened      Eyes:  EOM    [x]  Normal  [] Abnormal-  Sclera  [x]  Normal  [] Abnormal -         Discharge [x]  None visible  [] Abnormal -    HENT:   [x] Normocephalic, atraumatic. [] Abnormal   [x] Mouth/Throat: Mucous membranes are moist.     External Ears [x] Normal  [] Abnormal-     Neck: [x] No visualized mass     Pulmonary/Chest: [x] Respiratory effort normal.  [x] No visualized signs of difficulty breathing or respiratory distress        [] Abnormal-      Musculoskeletal:   [] Normal gait with no signs of ataxia         [x] Normal range of motion of neck        [] Abnormal-       Neurological:        [x] No Facial Asymmetry (Cranial nerve 7 motor function) (limited exam to video visit)          [x] No gaze palsy        [] Abnormal-         Skin:        [x] No significant exanthematous lesions or discoloration noted on facial skin         [] Abnormal-            Psychiatric:       [x] Normal Affect [x] No Hallucinations        [] Abnormal-         Assessment and Plan:      Lumbar disc disease  Set up physical therapy for home     Closed fracture of hip ([facility] [address]  Could recheck xray     Osteoporosis  Try fosamax now     Gait disturbance  With the low back issues unable to walk well     Congenital renal agenesis and dysgenesis  Stable but will cont to follow creatinine    Episode of recurrent major depressive disorder [facility])  May be contributing to her memory loss- see Dr. Holley Danielle    Malignant neoplasm of ascending colon [facility])  utd w oncology     Pure hypercholesterolemia  Will add statin next visit     Alzheimer's disease [facility])  Needs geriatric psych eval and to have home health assess ability to function at home safely               Return in about 2 months (around 9/29/2020), or AWV. Stephani Flanagan is a 77 y.o. female being evaluated by a Virtual Visit (video visit) encounter to address concerns as mentioned above.   A caregiver was present when appropriate. Due to this being a TeleHealth encounter (During ZDYDR-16 public health emergency), evaluation of the following organ systems was limited: Vitals/Constitutional/EENT/Resp/CV/GI//MS/Neuro/Skin/Heme-Lymph-Imm. Pursuant to the emergency declaration under the [address] [place] [address], [address] [place] [address] and the [employer] and [employer] Act, this Virtual Visit was conducted with patient's (and/or legal guardian's) consent, to reduce the patient's risk of exposure to COVID-19 and provide necessary medical care. The patient (and/or legal guardian) has also been advised to contact this office for worsening conditions or problems, and seek emergency medical treatment and/or call 911 if deemed necessary. Patient identification was verified at the start of the visit: yes    Total time spent on this encounter: 25 mins    Services were provided through a video synchronous discussion virtually to substitute for in-person clinic visit. Patient and provider were located at their individual homes. --Doris Zavaal MD on 7/29/2020 at 2:55 PM    An electronic signature was used to authenticate this note.

## 2020-08-03 ENCOUNTER — TELEPHONE (OUTPATIENT)
Dept: INTERNAL MEDICINE CLINIC | Age: 75
End: 2020-08-03

## 2020-08-03 NOTE — TELEPHONE ENCOUNTER
Patient daughter called to follow up on home health care for the patient. Patient daughter states she has not heard from them and would like to know if orders had been placed and with whom. Please advise.

## 2020-08-03 NOTE — TELEPHONE ENCOUNTER
Spoke with patient daughter informed her that referral was sent on 7/30/20 spoke with [facility] they will call x 1-2 days

## 2020-08-13 ENCOUNTER — TELEPHONE (OUTPATIENT)
Dept: INTERNAL MEDICINE CLINIC | Age: 75
End: 2020-08-13

## 2020-08-17 ENCOUNTER — TELEPHONE (OUTPATIENT)
Dept: INTERNAL MEDICINE CLINIC | Age: 75
End: 2020-08-17

## 2020-08-20 ENCOUNTER — TELEPHONE (OUTPATIENT)
Dept: INTERNAL MEDICINE CLINIC | Age: 75
End: 2020-08-20

## 2020-08-20 NOTE — TELEPHONE ENCOUNTER
Sunny Connelly, called stating will see pt 2 times/week times 4 weeks. Would also like to add speech therapy to this.       Please advise

## 2020-08-21 ENCOUNTER — TELEPHONE (OUTPATIENT)
Dept: INTERNAL MEDICINE CLINIC | Age: 75
End: 2020-08-21

## 2020-08-21 NOTE — TELEPHONE ENCOUNTER
Pt daughter would like to ask a question about a dr that was referred during the tel visit with Dr. eKlly Ortiz

## 2020-08-23 NOTE — TELEPHONE ENCOUNTER
So not much more we can do!  That was really the best option-at some point they may have to force the issue- I can discuss it w her her next visit-make sure she gets an appt for her yearly AWV in September sometime after the 17th

## 2020-08-28 ENCOUNTER — TELEPHONE (OUTPATIENT)
Dept: INTERNAL MEDICINE CLINIC | Age: 75
End: 2020-08-28

## 2020-08-28 NOTE — TELEPHONE ENCOUNTER
Karmen with [facility] called to advise the ST evaluation and requesting to see patient 2 times a week for 4 weeks. Approval needed. Please advise.

## 2020-09-18 ENCOUNTER — TELEPHONE (OUTPATIENT)
Dept: INTERNAL MEDICINE CLINIC | Age: 75
End: 2020-09-18

## 2020-09-22 ENCOUNTER — VIRTUAL VISIT (OUTPATIENT)
Dept: INTERNAL MEDICINE CLINIC | Age: 75
End: 2020-09-22
Payer: MEDICARE

## 2020-09-22 PROBLEM — F51.01 PRIMARY INSOMNIA: Status: ACTIVE | Noted: 2020-09-22

## 2020-09-22 PROCEDURE — 1090F PRES/ABSN URINE INCON ASSESS: CPT | Performed by: INTERNAL MEDICINE

## 2020-09-22 PROCEDURE — G8399 PT W/DXA RESULTS DOCUMENT: HCPCS | Performed by: INTERNAL MEDICINE

## 2020-09-22 PROCEDURE — G8427 DOCREV CUR MEDS BY ELIG CLIN: HCPCS | Performed by: INTERNAL MEDICINE

## 2020-09-22 PROCEDURE — 1123F ACP DISCUSS/DSCN MKR DOCD: CPT | Performed by: INTERNAL MEDICINE

## 2020-09-22 PROCEDURE — 4040F PNEUMOC VAC/ADMIN/RCVD: CPT | Performed by: INTERNAL MEDICINE

## 2020-09-22 PROCEDURE — 99214 OFFICE O/P EST MOD 30 MIN: CPT | Performed by: INTERNAL MEDICINE

## 2020-09-22 PROCEDURE — 3017F COLORECTAL CA SCREEN DOC REV: CPT | Performed by: INTERNAL MEDICINE

## 2020-09-22 RX ORDER — ALENDRONATE SODIUM 70 MG/1
70 TABLET ORAL
Qty: 4 TABLET | Refills: 3 | Status: SHIPPED | OUTPATIENT
Start: 2020-09-22 | End: 2021-05-17

## 2020-09-22 RX ORDER — LISINOPRIL 5 MG/1
5 TABLET ORAL DAILY
Qty: 90 TABLET | Refills: 1 | Status: SHIPPED | OUTPATIENT
Start: 2020-09-22 | End: 2021-03-10 | Stop reason: SDUPTHER

## 2020-09-22 RX ORDER — GALANTAMINE HYDROBROMIDE 8 MG/1
8 TABLET, FILM COATED ORAL 2 TIMES DAILY
Qty: 60 TABLET | Refills: 3 | Status: SHIPPED | OUTPATIENT
Start: 2020-09-22 | End: 2021-02-03

## 2020-09-22 NOTE — PROGRESS NOTES
2020    TELEHEALTH EVALUATION -- Audio/Visual (During OSQAA-69 public health emergency)    HPI:    Savanah Aviles (:  1945) has requested an audio/video evaluation for the following concern(s):  BP's are up if aggravated but when she is calm the bp is only sl up at 140/94-still struggling w the dementia and pt does get aggravated easily- she is having a lot of trouble sleeping - to have geriatric psych eval again soon- pt did not like the psychologist at first but then liked her-       Review of Systems    Prior to Visit Medications    Medication Sig Taking? Authorizing Provider   galantamine (RAZADYNE) 8 MG tablet Take 1 tablet by mouth 2 times daily Yes Valentín Lazar MD   alendronate (FOSAMAX) 70 MG tablet Take 1 tablet by mouth every 7 days Yes Valentín Lazar MD   lisinopril (PRINIVIL;ZESTRIL) 5 MG tablet Take 1 tablet by mouth daily Yes Valentín Lazar MD   albuterol sulfate HFA (PROAIR HFA) 108 (90 Base) MCG/ACT inhaler Inhale 2 puffs into the lungs every 6 hours as needed for Wheezing Yes Valentín Lazar MD   fluticasone (FLOVENT HFA) 110 MCG/ACT inhaler INHALE 2 PUFFS TWICE A DAY AS DIRECTED Yes Valentín Lazar MD   fluticasone (FLONASE) 50 MCG/ACT nasal spray One spray each nostril daily Yes Valentín Lazar MD   Cholecalciferol (VITAMIN D3) 1000 UNITS CAPS Take 1 capsule by mouth daily Yes Historical Provider, MD   vitamin B-12 (CYANOCOBALAMIN) 500 MCG tablet Take 500 mcg by mouth daily Yes Historical Provider, MD   Multiple Vitamins-Minerals (THERAPEUTIC MULTIVITAMIN-MINERALS) tablet Take 1 tablet by mouth daily. Yes Historical Provider, MD   Biotin 1 MG CAPS Take 2 tablets by mouth daily. Yes Historical Provider, MD   Cranberry 125 MG TABS Take 1 tablet by mouth.  Yes Historical Provider, MD   loratadine (CLARITIN) 10 MG tablet Take 10 mg by mouth daily as needed  Yes Valentín Lazar MD   albuterol (PROVENTIL HFA) 108 (90 BASE) MCG/ACT inhaler Inhale 2 puffs into the lungs every 6 hours as needed for No significant exanthematous lesions or discoloration noted on facial skin         [] Abnormal-            Psychiatric:       [x] Normal Affect [x] No Hallucinations        [] Abnormal-         Assessment and Plan:      Ep[facility]  Cont f/u w psychologist and NP    Pure hypercholesterolemia  Cont to monitor     Alzheimer's disease ([facility]  Cont f/u w therapist     Primary insomnia  To consider meds per therapist     Essential hypertension  Restart lisinopril at 5 mg nightly               Return in about 6 weeks (around 11/3/2020) for AWV. Pop Kraft is a 77 y.o. female being evaluated by a Virtual Visit (video visit) encounter to address concerns as mentioned above. A caregiver was present when appropriate. Due to this being a TeleHealth encounter (During UYXJK-73 public health emergency), evaluation of the following organ systems was limited: Vitals/Constitutional/EENT/Resp/CV/GI//MS/Neuro/Skin/Heme-Lymph-Imm. Pursuant to the emergency declaration under the [address] [place] [address] and the [employer] and [employer] Act, this Virtual Visit was conducted with patient's (and/or legal guardian's) consent, to reduce the patient's risk of exposure to COVID-19 and provide necessary medical care. The patient (and/or legal guardian) has also been advised to contact this office for worsening conditions or problems, and seek emergency medical treatment and/or call 911 if deemed necessary. Patient identification was verified at the start of the visit: yes    Total time spent on this encounter: 25 mins    Services were provided through a video synchronous discussion virtually to substitute for in-person clinic visit. Patient and provider were located at their individual homes. --Makenna Maria MD on 9/22/2020 at 2:19 PM    An electronic signature was used to authenticate this note.

## 2020-09-23 ENCOUNTER — TELEPHONE (OUTPATIENT)
Dept: INTERNAL MEDICINE CLINIC | Age: 75
End: 2020-09-23

## 2020-09-23 NOTE — TELEPHONE ENCOUNTER
Maryana Rivera with [facility] called to advise the patients BP was 155/97. Maryana Rivera advised the new prescription hadn't arrived yet while she was seeing the patient this morning.

## 2020-09-25 ENCOUNTER — TELEPHONE (OUTPATIENT)
Dept: INTERNAL MEDICINE CLINIC | Age: 75
End: 2020-09-25

## 2020-09-25 NOTE — TELEPHONE ENCOUNTER
home care is reporting a bp of 171/91 when they were doing a reeval but then the patient report bp 130/70

## 2020-11-11 ENCOUNTER — TELEPHONE (OUTPATIENT)
Dept: INTERNAL MEDICINE CLINIC | Age: 75
End: 2020-11-11

## 2020-11-11 NOTE — TELEPHONE ENCOUNTER
pts daughter contacted me- pt needs speech therapy sessions continued w [facility] per suggestion of her therapist Katia Grove- can we send an order or call alan ( [phone]) to see what it is she exactly needs? She is established w [facility] so not sure why they didn't just contact us directly!  Their # [id number] and fax [phone]

## 2020-11-13 NOTE — TELEPHONE ENCOUNTER
Patient son called this morning and states that he called [facility] and the did not receive the fax for the patient to have the speech therapy. Can you please resend it?      Please call to advise

## 2020-11-19 ENCOUNTER — TELEPHONE (OUTPATIENT)
Dept: INTERNAL MEDICINE CLINIC | Age: 75
End: 2020-11-19

## 2020-11-19 NOTE — TELEPHONE ENCOUNTER
Get her the verbal orders and keep checking bp's- pt is due for her AWV so can you speak w daughters and get that set up soon???  Then have them record bp's when she is calm and have them for me for the AWV

## 2020-11-19 NOTE — TELEPHONE ENCOUNTER
Nacho Houston with [facility] called needs verbals orders for speech therapy once a week for 5 weeks. Also her, BP was 143/92 on Tuesday,  11/17/2020. She seems to think the nature of the things they are working on and talking about, rosa isela gets a little anxious, so if you want to change the range please let her know. Please call to advise.

## 2020-11-30 ENCOUNTER — VIRTUAL VISIT (OUTPATIENT)
Dept: INTERNAL MEDICINE CLINIC | Age: 75
End: 2020-11-30
Payer: MEDICARE

## 2020-11-30 PROBLEM — R47.89 WORD FINDING DIFFICULTY: Status: ACTIVE | Noted: 2020-11-30

## 2020-11-30 PROCEDURE — G0439 PPPS, SUBSEQ VISIT: HCPCS | Performed by: INTERNAL MEDICINE

## 2020-11-30 PROCEDURE — 1123F ACP DISCUSS/DSCN MKR DOCD: CPT | Performed by: INTERNAL MEDICINE

## 2020-11-30 PROCEDURE — 3017F COLORECTAL CA SCREEN DOC REV: CPT | Performed by: INTERNAL MEDICINE

## 2020-11-30 PROCEDURE — 4040F PNEUMOC VAC/ADMIN/RCVD: CPT | Performed by: INTERNAL MEDICINE

## 2020-11-30 PROCEDURE — G8484 FLU IMMUNIZE NO ADMIN: HCPCS | Performed by: INTERNAL MEDICINE

## 2020-11-30 RX ORDER — FLUOXETINE HYDROCHLORIDE 20 MG/1
20 CAPSULE ORAL DAILY
Qty: 30 CAPSULE | Refills: 3 | Status: SHIPPED | OUTPATIENT
Start: 2020-11-30 | End: 2021-03-10 | Stop reason: SDUPTHER

## 2020-11-30 ASSESSMENT — ENCOUNTER SYMPTOMS
COLOR CHANGE: 0
WHEEZING: 0
ABDOMINAL PAIN: 0
CHEST TIGHTNESS: 0
BACK PAIN: 0

## 2020-11-30 NOTE — PROGRESS NOTES
2020    TELEHEALTH EVALUATION -- Audio/Visual (During SNKXE-83 public health emergency)    HPI:    Fredi Couch (:  1945) has requested an audio/video evaluation for the following concern(s):    Needs further speech therapy-still having word finding issues- short term memory still poor-  bp's have been better now at  130/82- taking lisinopril 5 mg at bedtime -also taking fosamax regularly- having occasional tremor in the hands off and on-much worse w stress -needs mammogram,tdap and flu shot     Review of Systems   Constitutional: Negative for activity change, appetite change and fatigue. HENT: Negative for congestion. Eyes: Negative for visual disturbance. Respiratory: Negative for chest tightness and wheezing. Cardiovascular: Negative for chest pain and palpitations. Gastrointestinal: Negative for abdominal pain. Musculoskeletal: Negative for arthralgias and back pain. Skin: Negative for color change and rash. Neurological: Negative for weakness, light-headedness and headaches. Prior to Visit Medications    Medication Sig Taking? Authorizing Provider   FLUoxetine (PROZAC) 20 MG capsule Take 1 capsule by mouth daily Yes Logan Pereyra MD   galantamine (RAZADYNE) 8 MG tablet Take 1 tablet by mouth 2 times daily Yes Logan Pereyra MD   alendronate (FOSAMAX) 70 MG tablet Take 1 tablet by mouth every 7 days Yes Logan Pereyra MD   lisinopril (PRINIVIL;ZESTRIL) 5 MG tablet Take 1 tablet by mouth daily Yes Logan Pereyra MD   Cholecalciferol (VITAMIN D3) 1000 UNITS CAPS Take 1 capsule by mouth daily Yes Historical Provider, MD   vitamin B-12 (CYANOCOBALAMIN) 500 MCG tablet Take 500 mcg by mouth daily Yes Historical Provider, MD   Multiple Vitamins-Minerals (THERAPEUTIC MULTIVITAMIN-MINERALS) tablet Take 1 tablet by mouth daily.  Yes Historical Provider, MD   albuterol sulfate HFA (PROAIR HFA) 108 (90 Base) MCG/ACT inhaler Inhale 2 puffs into the lungs every 6 hours as needed for Wheezing  Patient not taking: Reported on 2020  Samuel Gama MD   fluticasone (FLOVENT HFA) 110 MCG/ACT inhaler INHALE 2 PUFFS TWICE A DAY AS DIRECTED  Patient not taking: Reported on 2020  Samuel Gama MD   fluticasone [facility]) 50 MCG/ACT nasal spray One spray each nostril daily  Patient not taking: Reported on 2020  Samuel Gama MD   Biotin 1 MG CAPS Take 2 tablets by mouth daily. Historical Provider, MD   Cranberry 125 MG TABS Take 1 tablet by mouth. Historical Provider, MD   albuterol (PROVENTIL HFA) 108 (90 BASE) MCG/ACT inhaler Inhale 2 puffs into the lungs every 6 hours as needed for Wheezing.   Samuel Gama MD   loratadine (CLARITIN) 10 MG tablet Take 10 mg by mouth daily as needed   Samuel Gama MD       Social History     Tobacco Use    Smoking status: Former Smoker     Packs/day: 0.50     Years: 15.00     Pack years: 7.50     Types: Cigarettes     Last attempt to quit: 1981     Years since quittin.6    Smokeless tobacco: Never Used   Substance Use Topics    Alcohol use: No     Alcohol/week: 0.0 standard drinks    Drug use: No        Past Medical History:   Diagnosis Date    Allergic     Allergic rhinitis, cause unspecified     Anemia     Asymptomatic varicose veins     Benign neoplasm of skin, site unspecified     Cancer (HCC)     Congenital renal agenesis and dysgenesis     Edema     GERD (gastroesophageal reflux disease)     Osteopetrosis     Pain in limb     Symptomatic menopausal or female climacteric states     Tobacco use disorder     Unspecified asthma(493.90)     allergy induced    Unspecified essential hypertension        Family History   Problem Relation Age of Onset    Alzheimer's Disease Mother     Coronary Art Dis Father     Diabetes Father     Hypertension Father     Elevated Lipids Father     Asthma Sister     Asthma Sister     Cancer Maternal Aunt         breast         PHYSICAL EXAMINATION:  There were no vitals filed for this visit. Constitutional: [x] Appears well-developed and well-nourished [x] No apparent distress      [] Abnormal-   Mental status  [x] Alert and awake  [x] Oriented to person/place/time [x]Able to follow commands      Eyes:  EOM    [x]  Normal  [] Abnormal-  Sclera  [x]  Normal  [] Abnormal -         Discharge [x]  None visible  [] Abnormal -    HENT:   [x] Normocephalic, atraumatic. [] Abnormal   [x] Mouth/Throat: Mucous membranes are moist.     External Ears [x] Normal  [] Abnormal-     Neck: [x] No visualized mass     Pulmonary/Chest: [x] Respiratory effort normal.  [x] No visualized signs of difficulty breathing or respiratory distress        [] Abnormal-      Musculoskeletal:   [] Normal gait with no signs of ataxia         [x] Normal range of motion of neck        [] Abnormal-       Neurological:        [x] No Facial Asymmetry (Cranial nerve 7 motor function) (limited exam to video visit)          [x] No gaze palsy        [] Abnormal-         Skin:        [x] No significant exanthematous lesions or discoloration noted on facial skin         [] Abnormal-            Psychiatric:       [x] Normal Affect [x] No Hallucinations        [] Abnormal-         Assessment and Plan:      Alzheimer's disease ([facility]  Cont speech therapy for help w word finding and speech issues     Word finding difficulty  Cont w speech therapy for now     Episode of recurrent major depressive disorder ([facility]  Doing well w prozac     Essential hypertension  Better control now-cont to monitor    Osteoporosis  Cont on fosamax now               Return in about 4 months (around 3/30/2021) for follow up. Gwyn Stanton is a 77 y.o. female being evaluated by a Virtual Visit (video visit) encounter to address concerns as mentioned above. A caregiver was present when appropriate.  Due to this being a TeleHealth encounter (During [facility]-57 public health emergency), evaluation of the following organ systems was limited: Vitals/Constitutional/EENT/Resp/CV/GI//MS/Neuro/Skin/Heme-Lymph-Imm. Pursuant to the emergency declaration under the [facility] [address] and the [employer] and [employer] Act, this Virtual Visit was conducted with patient's (and/or legal guardian's) consent, to reduce the patient's risk of exposure to COVID-19 and provide necessary medical care. The patient (and/or legal guardian) has also been advised to contact this office for worsening conditions or problems, and seek emergency medical treatment and/or call 911 if deemed necessary. Patient identification was verified at the start of the visit: yes    Total time spent on this encounter: 25 mins    Services were provided through a video synchronous discussion virtually to substitute for in-person clinic visit. Patient and provider were located at their individual homes. --Gil Song MD on 2020 at 5:03 PM    An electronic signature was used to authenticate this note. Medicare Annual Wellness Visit  Name: Cherie Forbes Date: 2020   MRN: <[id number]> Sex: Female   Age: 77 y.o. Ethnicity: Non-/Non    : 1945 Race: Moris Rosen is here for Medication Check (BP check )    Screenings for behavioral, psychosocial and functional/safety risks, and cognitive dysfunction are all negative except as indicated below. These results, as well as other patient data from the [address] form, are documented in Flowsheets linked to this Encounter. Allergies   Allergen Reactions    Dye [Iodides] Anaphylaxis    Iodine Hives, Shortness Of Breath and Itching    Azithromycin      intolerance    Codeine Nausea And Vomiting     Headache severe         Prior to Visit Medications    Medication Sig Taking?  Authorizing Provider   FLUoxetine (PROZAC) 20 MG capsule Take 1 capsule by mouth daily Yes Gil Snog MD asthma(493.90)     allergy induced    Unspecified essential hypertension        Past Surgical History:   Procedure Laterality Date    APPENDECTOMY       SECTION  x3    COLECTOMY  2015    HERNIA REPAIR  2004    HYSTERECTOMY, TOTAL ABDOMINAL  2006    OTHER SURGICAL HISTORY  09/10/2015    LAPAROSCOPIC ASSISTED RIGHT HEMICOLECTOMY. PLACEMENT OF PAIN         Family History   Problem Relation Age of Onset    Alzheimer's Disease Mother     Coronary Art Dis Father     Diabetes Father     Hypertension Father    Jeaneth Me Elevated Lipids Father     Asthma Sister     Asthma Sister     Cancer Maternal Aunt         breast       CareTeam (Including outside providers/suppliers regularly involved in providing care):   Patient Care Team:  John Sharpe MD as PCP - Skyler Raya MD as PCP - Hematology/Oncology (Hematology and Oncology)  John Sharpe MD as PCP - [facility]  King Osvaldo MD as Surgeon (General Surgery)    Wt Readings from Last 3 Encounters:   20 135 lb (61.2 kg)   19 137 lb 12.8 oz (62.5 kg)   19 136 lb (61.7 kg)     Patient-Reported Vitals 2020   Patient-Reported Weight 137lb   Patient-Reported Systolic 520   Patient-Reported Diastolic 93   Patient-Reported Temperature -      There is no height or weight on file to calculate BMI. Based upon direct observation of the patient, evaluation of cognition reveals global memory impairment noted. Patient's complete Health Risk Assessment and screening values have been reviewed and are found in Flowsheets. The following problems were reviewed today and where indicated follow up appointments were made and/or referrals ordered. Positive Risk Factor Screenings with Interventions:     No Positive Risk Factors identified today.     Personalized Preventive Plan   Current Health Maintenance Status  Immunization History   Administered Date(s) Administered    Pneumococcal Conjugate 13-valent (Rogena Peabody) Appropriations Act, this Virtual Visit was conducted with patient's (and/or legal guardian's) consent, to reduce the patient's risk of exposure to COVID-19 and provide necessary medical care. The patient (and/or legal guardian) has also been advised to contact this office for worsening conditions or problems, and seek emergency medical treatment and/or call 911 if deemed necessary. Patient identification was verified at the start of the visit: Yes    Services were provided through a video synchronous discussion virtually to substitute for in-person clinic visit. Patient and provider were located at their individual homes. --Elaine Vera MD on 11/30/2020 at 5:03 PM    An electronic signature was used to authenticate this note.

## 2020-11-30 NOTE — PATIENT INSTRUCTIONS
Personalized Preventive Plan for [place] - 11/30/2020  Medicare offers a range of preventive health benefits. Some of the tests and screenings are paid in full while other may be subject to a deductible, co-insurance, and/or copay. Some of these benefits include a comprehensive review of your medical history including lifestyle, illnesses that may run in your family, and various assessments and screenings as appropriate. After reviewing your medical record and screening and assessments performed today your provider may have ordered immunizations, labs, imaging, and/or referrals for you. A list of these orders (if applicable) as well as your Preventive Care list are included within your After Visit Summary for your review. Other Preventive Recommendations:    · A preventive eye exam performed by an eye specialist is recommended every 1-2 years to screen for glaucoma; cataracts, macular degeneration, and other eye disorders. · A preventive dental visit is recommended every 6 months. · Try to get at least 150 minutes of exercise per week or 10,000 steps per day on a pedometer . · Order or download the FREE \"Exercise & Physical Activity: Your Everyday Guide\" from [employer]. Call 0-129.361.8758 or search [employer] online. · You need 7410-4004 mg of calcium and 5582-9319 IU of vitamin D per day. It is possible to meet your calcium requirement with diet alone, but a vitamin D supplement is usually necessary to meet this goal.  · When exposed to the sun, use a sunscreen that protects against both UVA and UVB radiation with an SPF of 30 or greater. Reapply every 2 to 3 hours or after sweating, drying off with a towel, or swimming. · Always wear a seat belt when traveling in a car. Always wear a helmet when riding a bicycle or motorcycle.

## 2020-12-09 ENCOUNTER — TELEPHONE (OUTPATIENT)
Dept: INTERNAL MEDICINE CLINIC | Age: 75
End: 2020-12-09

## 2021-02-03 RX ORDER — GALANTAMINE HYDROBROMIDE 8 MG/1
TABLET, FILM COATED ORAL
Qty: 60 TABLET | Refills: 3 | Status: SHIPPED | OUTPATIENT
Start: 2021-02-03 | End: 2021-12-02

## 2021-03-03 ENCOUNTER — TELEPHONE (OUTPATIENT)
Dept: INTERNAL MEDICINE CLINIC | Age: 76
End: 2021-03-03

## 2021-03-03 DIAGNOSIS — G30.1 LATE ONSET ALZHEIMER'S DISEASE WITHOUT BEHAVIORAL DISTURBANCE (HCC): ICD-10-CM

## 2021-03-03 DIAGNOSIS — F02.80 LATE ONSET ALZHEIMER'S DISEASE WITHOUT BEHAVIORAL DISTURBANCE (HCC): ICD-10-CM

## 2021-03-03 DIAGNOSIS — F51.01 PRIMARY INSOMNIA: ICD-10-CM

## 2021-03-03 DIAGNOSIS — Z11.59 ENCOUNTER FOR HEPATITIS C SCREENING TEST FOR LOW RISK PATIENT: ICD-10-CM

## 2021-03-03 DIAGNOSIS — F33.0 MILD EPISODE OF RECURRENT MAJOR DEPRESSIVE DISORDER (HCC): ICD-10-CM

## 2021-03-03 DIAGNOSIS — E78.00 PURE HYPERCHOLESTEROLEMIA: ICD-10-CM

## 2021-03-03 LAB
A/G RATIO: 1.6 (ref 1.1–2.2)
ALBUMIN SERPL-MCNC: 4.3 G/DL (ref 3.4–5)
ALP BLD-CCNC: 37 U/L (ref 40–129)
ALT SERPL-CCNC: 12 U/L (ref 10–40)
ANION GAP SERPL CALCULATED.3IONS-SCNC: 12 MMOL/L (ref 3–16)
AST SERPL-CCNC: 20 U/L (ref 15–37)
BASOPHILS ABSOLUTE: 0.1 K/UL (ref 0–0.2)
BASOPHILS RELATIVE PERCENT: 0.7 %
BILIRUB SERPL-MCNC: 0.4 MG/DL (ref 0–1)
BUN BLDV-MCNC: 17 MG/DL (ref 7–20)
CALCIUM SERPL-MCNC: 9.5 MG/DL (ref 8.3–10.6)
CHLORIDE BLD-SCNC: 107 MMOL/L (ref 99–110)
CHOLESTEROL, TOTAL: 233 MG/DL (ref 0–199)
CO2: 26 MMOL/L (ref 21–32)
CREAT SERPL-MCNC: 0.9 MG/DL (ref 0.6–1.2)
CREATININE URINE: 196.9 MG/DL (ref 28–259)
EOSINOPHILS ABSOLUTE: 0.3 K/UL (ref 0–0.6)
EOSINOPHILS RELATIVE PERCENT: 2.9 %
GFR AFRICAN AMERICAN: >60
GFR NON-AFRICAN AMERICAN: >60
GLOBULIN: 2.7 G/DL
GLUCOSE BLD-MCNC: 91 MG/DL (ref 70–99)
HCT VFR BLD CALC: 40.6 % (ref 36–48)
HDLC SERPL-MCNC: 78 MG/DL (ref 40–60)
HEMOGLOBIN: 13.5 G/DL (ref 12–16)
HEPATITIS C ANTIBODY INTERPRETATION: NORMAL
LDL CHOLESTEROL CALCULATED: 128 MG/DL
LYMPHOCYTES ABSOLUTE: 2.5 K/UL (ref 1–5.1)
LYMPHOCYTES RELATIVE PERCENT: 26.2 %
MCH RBC QN AUTO: 29.8 PG (ref 26–34)
MCHC RBC AUTO-ENTMCNC: 33.2 G/DL (ref 31–36)
MCV RBC AUTO: 89.8 FL (ref 80–100)
MICROALBUMIN UR-MCNC: 1.3 MG/DL
MICROALBUMIN/CREAT UR-RTO: 6.6 MG/G (ref 0–30)
MONOCYTES ABSOLUTE: 0.9 K/UL (ref 0–1.3)
MONOCYTES RELATIVE PERCENT: 9.8 %
NEUTROPHILS ABSOLUTE: 5.7 K/UL (ref 1.7–7.7)
NEUTROPHILS RELATIVE PERCENT: 60.4 %
PDW BLD-RTO: 15.1 % (ref 12.4–15.4)
PLATELET # BLD: 400 K/UL (ref 135–450)
PMV BLD AUTO: 7.4 FL (ref 5–10.5)
POTASSIUM SERPL-SCNC: 4.2 MMOL/L (ref 3.5–5.1)
RBC # BLD: 4.52 M/UL (ref 4–5.2)
SODIUM BLD-SCNC: 145 MMOL/L (ref 136–145)
TOTAL PROTEIN: 7 G/DL (ref 6.4–8.2)
TRIGL SERPL-MCNC: 135 MG/DL (ref 0–150)
TSH SERPL DL<=0.05 MIU/L-ACNC: 1.74 UIU/ML (ref 0.27–4.2)
VLDLC SERPL CALC-MCNC: 27 MG/DL
WBC # BLD: 9.5 K/UL (ref 4–11)

## 2021-03-03 NOTE — TELEPHONE ENCOUNTER
Son called wanting to know if the blood test his mother had today could tell if she is taking her mediations as prescribed. Please call to advise.

## 2021-03-05 ENCOUNTER — TELEPHONE (OUTPATIENT)
Dept: INTERNAL MEDICINE CLINIC | Age: 76
End: 2021-03-05

## 2021-03-05 NOTE — TELEPHONE ENCOUNTER
Yes! We can only communicate w 1 family member and as I said yesterday I havent seen her since late November so we need to do a visit -can you call Kellen and have her get the appt??? Also remind them that unfortunately I cannot communicate w text messages to my private phone any longer due to hippa and medico-legal issues so sorry about that!

## 2021-03-05 NOTE — TELEPHONE ENCOUNTER
MESSAGE GIVEN ROHAN WILL CALL TO MAKE APPT ROHAN JOSÉ IS THE PRIMARY CONTACT / NO MORE MORE TEXT MESSAGES ARE ALLOWED

## 2021-03-05 NOTE — TELEPHONE ENCOUNTER
Update:  Pt son called again after first message and also after sister Natalie Bravo spoke with Patrick. Son is stating that sister Natalie Bravo informed him that Dr. Adeline Alvarez does NOT need to see mom (PT) after Dr. Adeline Alvarez message was read to him stating that she DOES want to see pt. Encouraged to call per conference call so that everyone could get the same message at the same time or encouraged to utilize [employer] so Dr. Adeline Alvarez words do not get lost in translation. All 3 Children calling for same pt and confusing information.

## 2021-03-05 NOTE — TELEPHONE ENCOUNTER
YOHANI  Pt son called wanting to know if there is an issue with contacting sister Angie Ann for pt appointments and information. States that Angie Ann is not getting information and she is the one who is supposed to be contacted in regards to pt care. Explained to son that per ANIYAH, her listed  is Herbert Bush and that may be the reason why sister Angie Ann is not receiving any information. Pt son then states it sounds like a issue of miscommunication between his sisters.

## 2021-03-10 ENCOUNTER — OFFICE VISIT (OUTPATIENT)
Dept: INTERNAL MEDICINE CLINIC | Age: 76
End: 2021-03-10
Payer: MEDICARE

## 2021-03-10 VITALS
HEART RATE: 87 BPM | OXYGEN SATURATION: 98 % | TEMPERATURE: 98.4 F | WEIGHT: 139.8 LBS | DIASTOLIC BLOOD PRESSURE: 74 MMHG | SYSTOLIC BLOOD PRESSURE: 122 MMHG | BODY MASS INDEX: 25.73 KG/M2 | HEIGHT: 62 IN

## 2021-03-10 DIAGNOSIS — F02.80 LATE ONSET ALZHEIMER'S DISEASE WITHOUT BEHAVIORAL DISTURBANCE (HCC): ICD-10-CM

## 2021-03-10 DIAGNOSIS — M81.0 AGE-RELATED OSTEOPOROSIS WITHOUT CURRENT PATHOLOGICAL FRACTURE: ICD-10-CM

## 2021-03-10 DIAGNOSIS — J45.20 MILD INTERMITTENT ASTHMA WITHOUT COMPLICATION: ICD-10-CM

## 2021-03-10 DIAGNOSIS — G30.1 LATE ONSET ALZHEIMER'S DISEASE WITHOUT BEHAVIORAL DISTURBANCE (HCC): ICD-10-CM

## 2021-03-10 DIAGNOSIS — E78.00 PURE HYPERCHOLESTEROLEMIA: ICD-10-CM

## 2021-03-10 DIAGNOSIS — C18.2 MALIGNANT NEOPLASM OF ASCENDING COLON (HCC): ICD-10-CM

## 2021-03-10 DIAGNOSIS — G25.0 BENIGN ESSENTIAL TREMOR: ICD-10-CM

## 2021-03-10 DIAGNOSIS — F33.0 MILD EPISODE OF RECURRENT MAJOR DEPRESSIVE DISORDER (HCC): ICD-10-CM

## 2021-03-10 DIAGNOSIS — Z12.31 ENCOUNTER FOR SCREENING MAMMOGRAM FOR MALIGNANT NEOPLASM OF BREAST: Primary | ICD-10-CM

## 2021-03-10 DIAGNOSIS — E11.9 TYPE 2 DIABETES MELLITUS WITHOUT COMPLICATION, WITHOUT LONG-TERM CURRENT USE OF INSULIN (HCC): ICD-10-CM

## 2021-03-10 DIAGNOSIS — R53.82 CHRONIC FATIGUE: ICD-10-CM

## 2021-03-10 PROCEDURE — 99215 OFFICE O/P EST HI 40 MIN: CPT | Performed by: INTERNAL MEDICINE

## 2021-03-10 PROCEDURE — G8484 FLU IMMUNIZE NO ADMIN: HCPCS | Performed by: INTERNAL MEDICINE

## 2021-03-10 PROCEDURE — 1123F ACP DISCUSS/DSCN MKR DOCD: CPT | Performed by: INTERNAL MEDICINE

## 2021-03-10 PROCEDURE — 3046F HEMOGLOBIN A1C LEVEL >9.0%: CPT | Performed by: INTERNAL MEDICINE

## 2021-03-10 PROCEDURE — 1036F TOBACCO NON-USER: CPT | Performed by: INTERNAL MEDICINE

## 2021-03-10 PROCEDURE — 3017F COLORECTAL CA SCREEN DOC REV: CPT | Performed by: INTERNAL MEDICINE

## 2021-03-10 PROCEDURE — 4040F PNEUMOC VAC/ADMIN/RCVD: CPT | Performed by: INTERNAL MEDICINE

## 2021-03-10 PROCEDURE — G8399 PT W/DXA RESULTS DOCUMENT: HCPCS | Performed by: INTERNAL MEDICINE

## 2021-03-10 PROCEDURE — G8427 DOCREV CUR MEDS BY ELIG CLIN: HCPCS | Performed by: INTERNAL MEDICINE

## 2021-03-10 PROCEDURE — 2022F DILAT RTA XM EVC RTNOPTHY: CPT | Performed by: INTERNAL MEDICINE

## 2021-03-10 PROCEDURE — 1090F PRES/ABSN URINE INCON ASSESS: CPT | Performed by: INTERNAL MEDICINE

## 2021-03-10 PROCEDURE — G8417 CALC BMI ABV UP PARAM F/U: HCPCS | Performed by: INTERNAL MEDICINE

## 2021-03-10 RX ORDER — LISINOPRIL 5 MG/1
5 TABLET ORAL DAILY
Qty: 90 TABLET | Refills: 1 | Status: SHIPPED | OUTPATIENT
Start: 2021-03-10 | End: 2022-06-20 | Stop reason: SDUPTHER

## 2021-03-10 RX ORDER — FLUOXETINE HYDROCHLORIDE 20 MG/1
20 CAPSULE ORAL DAILY
Qty: 90 CAPSULE | Refills: 3 | Status: SHIPPED | OUTPATIENT
Start: 2021-03-10 | End: 2022-06-20 | Stop reason: SDUPTHER

## 2021-03-10 RX ORDER — ALBUTEROL SULFATE 90 UG/1
2 AEROSOL, METERED RESPIRATORY (INHALATION) EVERY 6 HOURS PRN
Qty: 1 INHALER | Refills: 5 | Status: SHIPPED | OUTPATIENT
Start: 2021-03-10

## 2021-03-10 RX ORDER — FLUTICASONE PROPIONATE 50 MCG
SPRAY, SUSPENSION (ML) NASAL
Qty: 16 G | Refills: 5 | Status: SHIPPED | OUTPATIENT
Start: 2021-03-10 | End: 2022-08-29 | Stop reason: CLARIF

## 2021-03-10 RX ORDER — FLUTICASONE PROPIONATE 110 UG/1
AEROSOL, METERED RESPIRATORY (INHALATION)
Qty: 1 INHALER | Refills: 5 | Status: SHIPPED | OUTPATIENT
Start: 2021-03-10 | End: 2022-08-29 | Stop reason: ALTCHOICE

## 2021-03-10 ASSESSMENT — ENCOUNTER SYMPTOMS
WHEEZING: 0
BACK PAIN: 0
CHEST TIGHTNESS: 0
COLOR CHANGE: 0
ABDOMINAL PAIN: 0

## 2021-03-10 NOTE — PROGRESS NOTES
Subjective:      Patient ID: Ida Polanco is a 77 y.o. female. Chief Complaint   Patient presents with   Isaura Hendrickson     Pt states she knows she has dementia- still living at home-in w son who is very concerned but unable to help on a daily basis- is no longer driving but refuses any additional help in her home - no recent wheezing and hasn't needed rescue inhaler for more than 1 year -son is concerned she isn't taking her meds reliably    Review of Systems   Constitutional: Negative for activity change, appetite change and fatigue. HENT: Negative for congestion. Eyes: Negative for visual disturbance. Respiratory: Negative for chest tightness and wheezing. Cardiovascular: Negative for chest pain and palpitations. Gastrointestinal: Negative for abdominal pain. Musculoskeletal: Negative for arthralgias and back pain. Skin: Negative for color change and rash. Neurological: Negative for weakness, light-headedness and headaches. Psychiatric/Behavioral: Positive for confusion, dysphoric mood and sleep disturbance. The patient is nervous/anxious. Family History   Problem Relation Age of Onset   [facility] Alzheimer's Disease Mother     Coronary Art Dis Father     Diabetes Father     Hypertension Father    [facility] Elevated Lipids Father     Asthma Sister     Asthma Sister     Cancer Maternal Aunt         breast     Social History     Socioeconomic History    Marital status:       Spouse name: Not on file    Number of children: Not on file    Years of education: Not on file    Highest education level: Not on file   Occupational History    Not on file   Social Needs    Financial resource strain: Not on file    Food insecurity     Worry: Not on file     Inability: Not on file    Transportation needs     Medical: Not on file     Non-medical: Not on file   Tobacco Use    Smoking status: Former Smoker     Packs/day: 0.50     Years: 15.00     Pack years: 7.50     Types: Cigarettes     Quit date: 1981     Years since quittin.8    Smokeless tobacco: Never Used   Substance and Sexual Activity    Alcohol use: No     Alcohol/week: 0.0 standard drinks    Drug use: No    Sexual activity: Yes     Partners: Male   Lifestyle    Physical activity     Days per week: Not on file     Minutes per session: Not on file    Stress: Not on file   Relationships    Social connections     Talks on phone: Not on file     Gets together: Not on file     Attends [employer] service: Not on file     Active member of club or organization: Not on file     Attends meetings of clubs or organizations: Not on file     Relationship status: Not on file    Intimate partner violence     Fear of current or ex partner: Not on file     Emotionally abused: Not on file     Physically abused: Not on file     Forced sexual activity: Not on file   Other Topics Concern    Not on file   Social History Narrative    Not on file         Objective:   Physical Exam  Constitutional:       Appearance: She is well-developed. HENT:      Head: Normocephalic and atraumatic. Eyes:      Conjunctiva/sclera: Conjunctivae normal.      Pupils: Pupils are equal, round, and reactive to light. Neck:      Musculoskeletal: Normal range of motion and neck supple. Cardiovascular:      Rate and Rhythm: Normal rate and regular rhythm. Heart sounds: Normal heart sounds. Pulmonary:      Effort: Pulmonary effort is normal. No respiratory distress. Breath sounds: Normal breath sounds. Abdominal:      General: There is no distension. Tenderness: There is no abdominal tenderness. Lymphadenopathy:      Cervical: No cervical adenopathy. Skin:     General: Skin is warm and dry. Neurological:      General: No focal deficit present. Mental Status: She is alert and oriented to person, place, and time.    Psychiatric:         Mood and Affect: Mood normal.         Behavior: Behavior normal.      Comments: Very poor short term memory Assessment and Plan:      Malignant neoplasm of ascending colon ([facility] [address]  For repeat colonoscopy later this year    Type 2 diabetes mellitus without complication, without long-term current use of insulin (HCC)  Cont to follow on diet only    Episode of recurrent major depressive disorder ([facility] [address]  Cont prozac and f/u w psychiatry    Osteoporosis  Try to get pt and family to monitor fosamax dosing     Alzheimer's disease ([address]  Cont f/u w therapist and psychiatry       On this date 3/10/2021 I have spent more than 45 minutes reviewing previous notes, test results and face to face (virtual) with the patient and family discussing the diagnosis and importance of compliance with the treatment plan as well as documenting on the day of the visit. Encounter Diagnoses   Name Primary?     Encounter for screening mammogram for malignant neoplasm of breast Yes    Mild intermittent asthma without complication     Pure hypercholesterolemia     Benign essential tremor     Chronic fatigue     Late onset Alzheimer's disease without behavioral disturbance (HCC)     Mild episode of recurrent major depressive disorder (HCC)     Malignant neoplasm of ascending colon (HCC)     Type 2 diabetes mellitus without complication, without long-term current use of insulin (HCC)     Age-related osteoporosis without current pathological fracture        Orders Placed This Encounter   Procedures    ERWIN DIGITAL SCREEN W OR WO CAD BILATERAL     Standing Status:   Future     Standing Expiration Date:   3/10/2022     Scheduling Instructions:      Call [phone] then option #2 then option #1 to schedule

## 2021-03-17 PROBLEM — S72.009A CLOSED FRACTURE OF HIP (HCC): Status: RESOLVED | Noted: 2017-06-24 | Resolved: 2021-03-17

## 2021-03-17 PROBLEM — R47.89 WORD FINDING DIFFICULTY: Status: RESOLVED | Noted: 2020-11-30 | Resolved: 2021-03-17

## 2021-04-15 ENCOUNTER — TELEPHONE (OUTPATIENT)
Dept: INTERNAL MEDICINE CLINIC | Age: 76
End: 2021-04-15

## 2021-04-15 NOTE — TELEPHONE ENCOUNTER
Patient daughter called wanting to know what you think about increasing her galantamine (RAZADYNE) 8 MG tablet [[id number]? She states she is having a normal decline in her dementia but was just wanting to know if you think this would help? Also, she wants to know if you think she should be tested again at the [facility], which you had referred her too? They stated they have not tested her since July of 2020? If, so will she need another referral from you? She did leave a my chart message on 04/13/21, states its not emergency and she is aware that you are out of the office.      Thanks

## 2021-04-16 RX ORDER — GALANTAMINE HYDROBROMIDE 12 MG/1
12 TABLET, FILM COATED ORAL 2 TIMES DAILY
Qty: 60 TABLET | Refills: 3 | Status: SHIPPED | OUTPATIENT
Start: 2021-04-16 | End: 2022-06-20 | Stop reason: SDUPTHER

## 2021-04-16 NOTE — TELEPHONE ENCOUNTER
SPOKE WITH PATIENT  DAUGHTER MESSAGE GIVEN SHE IS ALREADY SEEING PSYCH DOCTOR  SHE STATED SHE BROUGHT IN  THE REPORTS IN [facility]

## 2021-04-17 NOTE — TELEPHONE ENCOUNTER
I thought so- so we can increase the medication but he should really weigh in on all meds for the dementia so they need to ask him if thats ok w him

## 2021-04-20 NOTE — TELEPHONE ENCOUNTER
SPOKE  WITH RICH (DAUGHTER) MESSAGE GIVEN SHE WILL ALSO HAVE RECORDS SENT TO OUR OFFICE FROM DR. PATTERSON

## 2021-05-17 RX ORDER — ALENDRONATE SODIUM 70 MG/1
TABLET ORAL
Qty: 4 TABLET | Refills: 3 | Status: SHIPPED | OUTPATIENT
Start: 2021-05-17 | End: 2021-12-02 | Stop reason: SDUPTHER

## 2021-06-03 ENCOUNTER — TELEPHONE (OUTPATIENT)
Dept: INTERNAL MEDICINE CLINIC | Age: 76
End: 2021-06-03

## 2021-06-28 ENCOUNTER — TELEPHONE (OUTPATIENT)
Dept: INTERNAL MEDICINE CLINIC | Age: 76
End: 2021-06-28

## 2021-06-28 NOTE — TELEPHONE ENCOUNTER
Pt needs forms for assisted living at [facility] [address]-  printed out last visit physical info- need to call daughter to ask how she wants us to fill out rest including DNR

## 2021-06-29 ENCOUNTER — TELEPHONE (OUTPATIENT)
Dept: INTERNAL MEDICINE CLINIC | Age: 76
End: 2021-06-29

## 2021-06-29 NOTE — TELEPHONE ENCOUNTER
Radha Campo asked if we can send over the History and physical paper work that  has signed off on   Fax number should be on paper work   Please advise

## 2021-07-01 ENCOUNTER — TELEPHONE (OUTPATIENT)
Dept: INTERNAL MEDICINE CLINIC | Age: 76
End: 2021-07-01

## 2021-07-01 NOTE — TELEPHONE ENCOUNTER
Donna Wagner @ [facility] calling about paperwork they faxed over and did not get a response. Please advise     Ph. [phone]  Fax.  [phone]

## 2021-07-01 NOTE — TELEPHONE ENCOUNTER
[phone] (home)   Spoke with son regarding (DNR) PAPERS,EVERYTHING WILL BE TAKEN CARE OF  @ NURSING FACILITY.

## 2021-07-01 NOTE — TELEPHONE ENCOUNTER
Steven Moise the patients son called to have Makayla call him back regarding home care documents. Please call and advise. Confirmed phone number with patient.

## 2021-07-06 ENCOUNTER — TELEPHONE (OUTPATIENT)
Dept: INTERNAL MEDICINE CLINIC | Age: 76
End: 2021-07-06

## 2021-07-06 NOTE — TELEPHONE ENCOUNTER
She has to take it under supervision as it needs to be taken on empty stomach w big glass of water and not lie down for 30 minutes so if that isn't possible she should just stop it!!no worries about stopping and starting-she was supposed to see the endocrinologist Dr. Ga Self below re: this or one of her partners-or could see    Dr. Valeriano Lorenz  and Dr. Kavitha Thomas [address] [place] [address].       Dr. Nola Mcarthur and Dr. Anastacia Rosales [phone] opt#[phone]      [address] [place] [address]  [phone]

## 2021-07-06 NOTE — TELEPHONE ENCOUNTER
Patient son called stating that they noticed she has not been taking her alendronate (FOSAMAX) 70 MG tablet [[id number], within the last 5 weeks. Do you still want her to take this ? What are the effects of her starting and stopping this? Please call to advise.

## 2021-12-02 ENCOUNTER — OFFICE VISIT (OUTPATIENT)
Dept: ENDOCRINOLOGY | Age: 76
End: 2021-12-02
Payer: MEDICARE

## 2021-12-02 VITALS
RESPIRATION RATE: 16 BRPM | DIASTOLIC BLOOD PRESSURE: 81 MMHG | WEIGHT: 151 LBS | HEIGHT: 63 IN | HEART RATE: 89 BPM | SYSTOLIC BLOOD PRESSURE: 153 MMHG | BODY MASS INDEX: 26.75 KG/M2

## 2021-12-02 DIAGNOSIS — E55.9 VITAMIN D DEFICIENCY: ICD-10-CM

## 2021-12-02 DIAGNOSIS — I10 ESSENTIAL HYPERTENSION: ICD-10-CM

## 2021-12-02 DIAGNOSIS — M81.0 AGE-RELATED OSTEOPOROSIS WITHOUT CURRENT PATHOLOGICAL FRACTURE: Primary | ICD-10-CM

## 2021-12-02 DIAGNOSIS — C18.2 MALIGNANT NEOPLASM OF ASCENDING COLON (HCC): ICD-10-CM

## 2021-12-02 DIAGNOSIS — F02.80 ALZHEIMER'S DISEASE (HCC): ICD-10-CM

## 2021-12-02 DIAGNOSIS — G30.9 ALZHEIMER'S DISEASE (HCC): ICD-10-CM

## 2021-12-02 DIAGNOSIS — R22.1 NECK SWELLING: ICD-10-CM

## 2021-12-02 PROCEDURE — 3017F COLORECTAL CA SCREEN DOC REV: CPT | Performed by: INTERNAL MEDICINE

## 2021-12-02 PROCEDURE — 1123F ACP DISCUSS/DSCN MKR DOCD: CPT | Performed by: INTERNAL MEDICINE

## 2021-12-02 PROCEDURE — G8399 PT W/DXA RESULTS DOCUMENT: HCPCS | Performed by: INTERNAL MEDICINE

## 2021-12-02 PROCEDURE — G8427 DOCREV CUR MEDS BY ELIG CLIN: HCPCS | Performed by: INTERNAL MEDICINE

## 2021-12-02 PROCEDURE — 99215 OFFICE O/P EST HI 40 MIN: CPT | Performed by: INTERNAL MEDICINE

## 2021-12-02 PROCEDURE — 1036F TOBACCO NON-USER: CPT | Performed by: INTERNAL MEDICINE

## 2021-12-02 PROCEDURE — G8417 CALC BMI ABV UP PARAM F/U: HCPCS | Performed by: INTERNAL MEDICINE

## 2021-12-02 PROCEDURE — 4040F PNEUMOC VAC/ADMIN/RCVD: CPT | Performed by: INTERNAL MEDICINE

## 2021-12-02 PROCEDURE — G8484 FLU IMMUNIZE NO ADMIN: HCPCS | Performed by: INTERNAL MEDICINE

## 2021-12-02 PROCEDURE — 1090F PRES/ABSN URINE INCON ASSESS: CPT | Performed by: INTERNAL MEDICINE

## 2021-12-02 RX ORDER — ALENDRONATE SODIUM 70 MG/1
TABLET ORAL
Qty: 4 TABLET | Refills: 3 | Status: SHIPPED | OUTPATIENT
Start: 2021-12-02 | End: 2022-06-20 | Stop reason: SDUPTHER

## 2021-12-02 NOTE — PROGRESS NOTES
LIGIA Rocha is a  who is here for evaluation of osteoporosis previously saw Dr Juany Nazario . She has a PMH of Osteoporosis, Asthma. 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 Had joint pains. FH of Osteoporosis: No   FH of hip fracture:No    Secondary causes of osteoprorsis: Menopause in late 45s.   ,No smoking, No prolonged steroid use. Calcium: 1200mg/d diet + 500 mg/d supplement = 1700 mg/d    __ Milk __ Cheese __ Yogurt __ Ice Cream __ Fortified OJ __ Other:     Vitamin D:  1000 IU/d     Follows up regularly with a dentist. No major dental procedures planned. She has hypertension and is on medication. She has been diagnosed with Dementia and is on medication. INTERIM   She denies any fractures in the last 2 years. She takes fosamax off and on. Previously she was opposed to taking any medication for osteoporosis but in the last 1 year when she was shifted to assisted living facility she has been getting her Fosamax on time.  She denies any side effects from that medication  And is not interested in injectables at this stage      Allergies   Allergen Reactions    Dye [Iodides] Anaphylaxis    Iodine Hives, Shortness Of Breath and Itching    Azithromycin      intolerance    Codeine Nausea And Vomiting     Headache severe     Outpatient Medications Marked as Taking for the 12/2/21 encounter (Office Visit) with Ru Callaway MD   Medication Sig Dispense Refill    alendronate (FOSAMAX) 70 MG tablet TAKE ONE TABLET BY MOUTH EVERY SEVEN DAYS 4 tablet 3    galantamine (RAZADYNE) 12 MG tablet Take 1 tablet by mouth 2 times daily 60 tablet 3    FLUoxetine (PROZAC) 20 MG capsule Take 1 capsule by mouth daily 90 capsule 3    lisinopril (PRINIVIL;ZESTRIL) 5 MG tablet Take 1 tablet by mouth daily 90 tablet 1    albuterol sulfate HFA (PROAIR HFA) 108 (90 Base) MCG/ACT inhaler Inhale 2 puffs into the lungs every 6 hours as needed for Wheezing 1 Inhaler 5    fluticasone (FLOVENT HFA) 110 MCG/ACT inhaler INHALE 2 PUFFS TWICE A DAY AS DIRECTED 1 Inhaler 5    fluticasone (FLONASE) 50 MCG/ACT nasal spray One spray each nostril daily 16 g 5    Cholecalciferol (VITAMIN D3) 1000 UNITS CAPS Take 1 capsule by mouth daily      vitamin B-12 (CYANOCOBALAMIN) 500 MCG tablet Take 500 mcg by mouth daily      Multiple Vitamins-Minerals (THERAPEUTIC MULTIVITAMIN-MINERALS) tablet Take 1 tablet by mouth daily.  Biotin 1 MG CAPS Take 2 tablets by mouth daily.  Cranberry 125 MG TABS Take 1 tablet by mouth.  loratadine (CLARITIN) 10 MG tablet Take 10 mg by mouth daily as needed            Vitals:    21 1016   BP: (!) 153/81   Pulse: 89   Resp: 16   Weight: 151 lb (68.5 kg)   Height: 5' 3\" (1.6 m)     Body mass index is 26.75 kg/m².      Past Medical History:   Diagnosis Date    Allergic     Allergic rhinitis, cause unspecified     Anemia     Asymptomatic varicose veins     Benign neoplasm of skin, site unspecified     Cancer (HCC)     [facility] fracture [facility] 2017    Left hip fracture 2017 s/p surgery w pin placement     Congenital renal agenesis and dysgenesis     Edema     GERD (gastroesophageal reflux disease)     Osteopetrosis     Pain in limb     Symptomatic menopausal or female climacteric states     Tobacco use disorder     Unspecified asthma(493.90)     allergy induced    Unspecified essential hypertension      Past Surgical History:   Procedure Laterality Date    APPENDECTOMY       SECTION  x3    COLECTOMY  2015    HERNIA REPAIR  2004    HYSTERECTOMY, TOTAL ABDOMINAL  2006    OTHER SURGICAL HISTORY  09/10/2015    LAPAROSCOPIC ASSISTED RIGHT HEMICOLECTOMY. PLACEMENT OF PAIN     Family History   Problem Relation Age of Onset   [facility] Alzheimer's Disease Mother     Coronary Art Dis Father     Diabetes Father     Hypertension Father    [facility] Elevated Lipids Father     Asthma Sister     Asthma Sister     Cancer Maternal Aunt         breast     Social History     Tobacco Use   Smoking Status Former Smoker    Packs/day: 0.50    Years: 15.00    Pack years: 7.50    Types: Cigarettes    Quit date: 1981    Years since quittin.6   Smokeless Tobacco Never Used      Social History     Substance and Sexual Activity   Alcohol Use No    Alcohol/week: 0.0 standard drinks     ROS   Review of system Please see the scanned document filled by the patient, reviewed  by me today.     EXAM  Constitutional: no acute distress, well appearing, well nourished  Psychiatric: oriented to person, place and time, judgement, insight and normal, recent and remote memory and intact and mood, affect are normal  Skin: skin and subcutaneous tissue is normal without mass,   Head and Face: examination of head and face revealed no abnormalities  Eyes: no lid or conjunctival swelling, no erythema or discharge, pupils are normal,   Ears/Nose: external inspection of ears and nose revealed no abnormalities, hearing is grossly normal  Oropharynx/Mouth/Face: lips, tongue and gums are normal with no lesions, the voice quality was normal  Neck: neck is supple and symmetric, with midline trachea and no masses, thyroid is normal    Pulmonary: no increased work of breathing or signs of respiratory distress, lungs are clear to auscultation  Cardiovascular: normal heart rate and rhythm, normal S1 and S2,   Musculoskeletal: normal gait and station,   Neurological: normal coordination, normal general cortical function      EXAM: DEXA       INDICATION: Screening for osteoporosis, post menopausal estrogen deficiency       COMPARISON:  2019       REGION ASSESSED:       L Spine (L 1-4): BMD=

## 2021-12-16 ENCOUNTER — TELEPHONE (OUTPATIENT)
Dept: INTERNAL MEDICINE CLINIC | Age: 76
End: 2021-12-16

## 2021-12-16 NOTE — TELEPHONE ENCOUNTER
----- Message from Bethany Matamoros sent at 12/16/2021 10:51 AM EST -----  Subject: Message to Provider    QUESTIONS  Information for Provider? patient's son madhav Brown called and has   electronic copies of how his mother is doing and they wanted the office to   have a copy from [facility] assisted living. The [place] phone number is   [phone] and he would like to know if he can send the information by   email  ---------------------------------------------------------------------------  --------------  [address]  What is the best way for the office to contact you? OK to leave message on   voicemail  Preferred Call Back Phone Number? [phone]  ---------------------------------------------------------------------------  --------------  SCRIPT ANSWERS  Relationship to Patient? Other  Representative Name? lorenzo Castro   Is the Representative on the appropriate HIPAA document in Epic?  Yes

## 2022-01-05 ENCOUNTER — HOSPITAL ENCOUNTER (OUTPATIENT)
Dept: GENERAL RADIOLOGY | Age: 77
Discharge: HOME | End: 2022-01-05
Payer: MEDICARE

## 2022-01-05 DIAGNOSIS — M81.0 AGE-RELATED OSTEOPOROSIS WITHOUT CURRENT PATHOLOGICAL FRACTURE: ICD-10-CM

## 2022-01-05 PROCEDURE — 77080 DXA BONE DENSITY AXIAL: CPT

## 2022-04-06 DIAGNOSIS — I10 ESSENTIAL HYPERTENSION: ICD-10-CM

## 2022-04-06 DIAGNOSIS — C18.2 MALIGNANT NEOPLASM OF ASCENDING COLON (HCC): ICD-10-CM

## 2022-04-06 DIAGNOSIS — M81.0 AGE-RELATED OSTEOPOROSIS WITHOUT CURRENT PATHOLOGICAL FRACTURE: ICD-10-CM

## 2022-04-06 DIAGNOSIS — E55.9 VITAMIN D DEFICIENCY: ICD-10-CM

## 2022-04-06 LAB
A/G RATIO: 1.7 (ref 1.1–2.2)
ALBUMIN SERPL-MCNC: 4.7 G/DL (ref 3.4–5)
ALP BLD-CCNC: 35 U/L (ref 40–129)
ALT SERPL-CCNC: 15 U/L (ref 10–40)
ANION GAP SERPL CALCULATED.3IONS-SCNC: 14 MMOL/L (ref 3–16)
AST SERPL-CCNC: 23 U/L (ref 15–37)
BILIRUB SERPL-MCNC: 0.4 MG/DL (ref 0–1)
BUN BLDV-MCNC: 16 MG/DL (ref 7–20)
CALCIUM SERPL-MCNC: 9.9 MG/DL (ref 8.3–10.6)
CHLORIDE BLD-SCNC: 102 MMOL/L (ref 99–110)
CO2: 23 MMOL/L (ref 21–32)
CREAT SERPL-MCNC: 1 MG/DL (ref 0.6–1.2)
GFR AFRICAN AMERICAN: >60
GFR NON-AFRICAN AMERICAN: 54
GLUCOSE BLD-MCNC: 86 MG/DL (ref 70–99)
PARATHYROID HORMONE INTACT: 40.8 PG/ML (ref 14–72)
POTASSIUM SERPL-SCNC: 4.6 MMOL/L (ref 3.5–5.1)
SODIUM BLD-SCNC: 139 MMOL/L (ref 136–145)
TOTAL PROTEIN: 7.4 G/DL (ref 6.4–8.2)
TSH SERPL DL<=0.05 MIU/L-ACNC: 1.77 UIU/ML (ref 0.27–4.2)
VITAMIN D 25-HYDROXY: 41.8 NG/ML

## 2022-04-08 ENCOUNTER — OFFICE VISIT (OUTPATIENT)
Dept: INTERNAL MEDICINE CLINIC | Age: 77
End: 2022-04-08
Payer: MEDICARE

## 2022-04-08 ENCOUNTER — TELEPHONE (OUTPATIENT)
Dept: INTERNAL MEDICINE CLINIC | Age: 77
End: 2022-04-08

## 2022-04-08 VITALS
WEIGHT: 153 LBS | HEIGHT: 63 IN | SYSTOLIC BLOOD PRESSURE: 138 MMHG | DIASTOLIC BLOOD PRESSURE: 80 MMHG | TEMPERATURE: 97.3 F | BODY MASS INDEX: 27.11 KG/M2

## 2022-04-08 DIAGNOSIS — R53.83 FATIGUE, UNSPECIFIED TYPE: ICD-10-CM

## 2022-04-08 DIAGNOSIS — F33.0 MILD EPISODE OF RECURRENT MAJOR DEPRESSIVE DISORDER (HCC): ICD-10-CM

## 2022-04-08 DIAGNOSIS — Z00.00 MEDICARE ANNUAL WELLNESS VISIT, SUBSEQUENT: ICD-10-CM

## 2022-04-08 DIAGNOSIS — C18.2 MALIGNANT NEOPLASM OF ASCENDING COLON (HCC): ICD-10-CM

## 2022-04-08 DIAGNOSIS — F02.80 ALZHEIMER'S DISEASE (HCC): ICD-10-CM

## 2022-04-08 DIAGNOSIS — M81.0 AGE-RELATED OSTEOPOROSIS WITHOUT CURRENT PATHOLOGICAL FRACTURE: ICD-10-CM

## 2022-04-08 DIAGNOSIS — I10 ESSENTIAL HYPERTENSION: ICD-10-CM

## 2022-04-08 DIAGNOSIS — Z12.31 ENCOUNTER FOR SCREENING MAMMOGRAM FOR MALIGNANT NEOPLASM OF BREAST: ICD-10-CM

## 2022-04-08 DIAGNOSIS — G30.9 ALZHEIMER'S DISEASE (HCC): ICD-10-CM

## 2022-04-08 DIAGNOSIS — E11.9 TYPE 2 DIABETES MELLITUS WITHOUT COMPLICATION, WITHOUT LONG-TERM CURRENT USE OF INSULIN (HCC): ICD-10-CM

## 2022-04-08 DIAGNOSIS — J45.40 MODERATE PERSISTENT ASTHMA WITHOUT COMPLICATION: ICD-10-CM

## 2022-04-08 DIAGNOSIS — E78.00 PURE HYPERCHOLESTEROLEMIA: Primary | ICD-10-CM

## 2022-04-08 PROBLEM — F51.01 PRIMARY INSOMNIA: Status: RESOLVED | Noted: 2020-09-22 | Resolved: 2022-04-08

## 2022-04-08 PROBLEM — R26.9 GAIT DISTURBANCE: Status: RESOLVED | Noted: 2020-07-29 | Resolved: 2022-04-08

## 2022-04-08 PROBLEM — F03.90 DEMENTIA (HCC): Status: ACTIVE | Noted: 2020-05-01

## 2022-04-08 PROCEDURE — 3017F COLORECTAL CA SCREEN DOC REV: CPT | Performed by: INTERNAL MEDICINE

## 2022-04-08 PROCEDURE — 99213 OFFICE O/P EST LOW 20 MIN: CPT | Performed by: INTERNAL MEDICINE

## 2022-04-08 PROCEDURE — G8427 DOCREV CUR MEDS BY ELIG CLIN: HCPCS | Performed by: INTERNAL MEDICINE

## 2022-04-08 PROCEDURE — 4040F PNEUMOC VAC/ADMIN/RCVD: CPT | Performed by: INTERNAL MEDICINE

## 2022-04-08 PROCEDURE — 2022F DILAT RTA XM EVC RTNOPTHY: CPT | Performed by: INTERNAL MEDICINE

## 2022-04-08 PROCEDURE — 1036F TOBACCO NON-USER: CPT | Performed by: INTERNAL MEDICINE

## 2022-04-08 PROCEDURE — G8417 CALC BMI ABV UP PARAM F/U: HCPCS | Performed by: INTERNAL MEDICINE

## 2022-04-08 PROCEDURE — 1090F PRES/ABSN URINE INCON ASSESS: CPT | Performed by: INTERNAL MEDICINE

## 2022-04-08 PROCEDURE — 3046F HEMOGLOBIN A1C LEVEL >9.0%: CPT | Performed by: INTERNAL MEDICINE

## 2022-04-08 PROCEDURE — 1123F ACP DISCUSS/DSCN MKR DOCD: CPT | Performed by: INTERNAL MEDICINE

## 2022-04-08 PROCEDURE — G0439 PPPS, SUBSEQ VISIT: HCPCS | Performed by: INTERNAL MEDICINE

## 2022-04-08 PROCEDURE — G8399 PT W/DXA RESULTS DOCUMENT: HCPCS | Performed by: INTERNAL MEDICINE

## 2022-04-08 SDOH — ECONOMIC STABILITY: FOOD INSECURITY: WITHIN THE PAST 12 MONTHS, THE FOOD YOU BOUGHT JUST DIDN'T LAST AND YOU DIDN'T HAVE MONEY TO GET MORE.: NEVER TRUE

## 2022-04-08 SDOH — ECONOMIC STABILITY: FOOD INSECURITY: WITHIN THE PAST 12 MONTHS, YOU WORRIED THAT YOUR FOOD WOULD RUN OUT BEFORE YOU GOT MONEY TO BUY MORE.: NEVER TRUE

## 2022-04-08 ASSESSMENT — PATIENT HEALTH QUESTIONNAIRE - PHQ9
6. FEELING BAD ABOUT YOURSELF - OR THAT YOU ARE A FAILURE OR HAVE LET YOURSELF OR YOUR FAMILY DOWN: 0
SUM OF ALL RESPONSES TO PHQ QUESTIONS 1-9: 0
3. TROUBLE FALLING OR STAYING ASLEEP: 0
9. THOUGHTS THAT YOU WOULD BE BETTER OFF DEAD, OR OF HURTING YOURSELF: 0
4. FEELING TIRED OR HAVING LITTLE ENERGY: 0
8. MOVING OR SPEAKING SO SLOWLY THAT OTHER PEOPLE COULD HAVE NOTICED. OR THE OPPOSITE, BEING SO FIGETY OR RESTLESS THAT YOU HAVE BEEN MOVING AROUND A LOT MORE THAN USUAL: 0
1. LITTLE INTEREST OR PLEASURE IN DOING THINGS: 0
SUM OF ALL RESPONSES TO PHQ QUESTIONS 1-9: 0
2. FEELING DOWN, DEPRESSED OR HOPELESS: 0
5. POOR APPETITE OR OVEREATING: 0
7. TROUBLE CONCENTRATING ON THINGS, SUCH AS READING THE NEWSPAPER OR WATCHING TELEVISION: 0
SUM OF ALL RESPONSES TO PHQ9 QUESTIONS 1 & 2: 0

## 2022-04-08 ASSESSMENT — ENCOUNTER SYMPTOMS
BACK PAIN: 0
CHEST TIGHTNESS: 0
WHEEZING: 0
COLOR CHANGE: 0
ABDOMINAL PAIN: 0

## 2022-04-08 ASSESSMENT — LIFESTYLE VARIABLES
HOW MANY STANDARD DRINKS CONTAINING ALCOHOL DO YOU HAVE ON A TYPICAL DAY: 1 OR 2
HOW OFTEN DO YOU HAVE A DRINK CONTAINING ALCOHOL: MONTHLY OR LESS

## 2022-04-08 ASSESSMENT — SOCIAL DETERMINANTS OF HEALTH (SDOH): HOW HARD IS IT FOR YOU TO PAY FOR THE VERY BASICS LIKE FOOD, HOUSING, MEDICAL CARE, AND HEATING?: NOT HARD AT ALL

## 2022-04-08 NOTE — PROGRESS NOTES
Subjective:      Patient ID: Analilia Felix is a 77 y.o. female. Chief Complaint   Patient presents with    Medicare AWV     review labs     Pt seeing [address] [place] [address] therapist regularly -does see therapist there regularly and has meds adjusted as needed-had recent repeat of full testing and was fairly stable- in her facility she is monitored and she is eating in their dining room- doing some activities there and getting out w her daughter's regularly- utd w endo re: osteoporosis -not sure when the last colonoscopy was-asthma is well controlled and rarely uses inhaler     Review of Systems   Constitutional: Negative for activity change, appetite change and fatigue. HENT: Negative for congestion. Eyes: Negative for visual disturbance. Respiratory: Negative for chest tightness and wheezing. Cardiovascular: Negative for chest pain and palpitations. Gastrointestinal: Negative for abdominal pain. Musculoskeletal: Negative for arthralgias and back pain. Skin: Negative for color change and rash. Neurological: Negative for weakness, light-headedness and headaches. Family History   Problem Relation Age of Onset   Jeaneth Me Alzheimer's Disease Mother     Coronary Art Dis Father     Diabetes Father     Hypertension Father    Jeaneth Me Elevated Lipids Father     Asthma Sister     Asthma Sister     Cancer Maternal Aunt         breast     Social History     Socioeconomic History    Marital status:       Spouse name: Not on file    Number of children: Not on file    Years of education: Not on file    Highest education level: Not on file   Occupational History    Not on file   Tobacco Use    Smoking status: Former Smoker     Packs/day: 0.50     Years: 15.00     Pack years: 7.50     Types: Cigarettes     Quit date: 1981     Years since quittin.9    Smokeless tobacco: Never Used   Vaping Use    Vaping Use: Never used   Substance and Sexual Activity    Alcohol use: No     Alcohol/week: 0.0 standard drinks    Drug use: No    Sexual activity: Yes     Partners: Male   Other Topics Concern    Not on file   Social History Narrative    Not on file     Social Determinants of Health     Financial Resource Strain: Low Risk     Difficulty of Paying Living Expenses: Not hard at all   Food Insecurity: No Food Insecurity    Worried About Running Out of Food in the Last Year: Never true    [address] in the Last Year: Never true   Transportation Needs:     Lack of Transportation (Medical): Not on file    Lack of Transportation (Non-Medical): Not on file   Physical Activity: Insufficiently Active    Days of Exercise per Week: 3 days    Minutes of Exercise per Session: 20 min   Stress:     Feeling of Stress : Not on file   Social Connections:     Frequency of Communication with Friends and Family: Not on file    Frequency of Social Gatherings with Friends and Family: Not on file    Attends Faith Services: Not on file    Active Member of Clubs or Organizations: Not on file    Attends Club or Organization Meetings: Not on file    Marital Status: Not on file   Intimate Partner Violence:     Fear of Current or Ex-Partner: Not on file    Emotionally Abused: Not on file    Physically Abused: Not on file    Sexually Abused: Not on file   Housing Stability:     Unable to Pay for Housing in the Last Year: Not on file    Number of [place] in the Last Year: Not on file    Unstable Housing in the Last Year: Not on file         Objective:   Physical Exam  Constitutional:       Appearance: She is well-developed. HENT:      Head: Normocephalic and atraumatic. Right Ear: External ear normal.      Left Ear: External ear normal.      Nose: Nose normal.   Eyes:      Conjunctiva/sclera: Conjunctivae normal.      Pupils: Pupils are equal, round, and reactive to light. Neck:      Thyroid: No thyromegaly. Vascular: No carotid bruit or JVD.    Cardiovascular:      Rate and Rhythm: Normal rate and regular rhythm. Pulses: Normal pulses. Heart sounds: Normal heart sounds. No murmur heard. No gallop. Comments: No carotid bruit noted bilaterally  Pulmonary:      Effort: Pulmonary effort is normal.      Breath sounds: Normal breath sounds. No wheezing or rales. Abdominal:      General: There is no distension. Palpations: Abdomen is soft. There is no hepatomegaly, splenomegaly or mass. Tenderness: There is no abdominal tenderness. There is no guarding or rebound. Genitourinary:     Comments: No breast masses palpable  Musculoskeletal:         General: Normal range of motion. Cervical back: Normal range of motion and neck supple. Lymphadenopathy:      Head:      Right side of head: No submandibular adenopathy. Left side of head: No submandibular adenopathy. Cervical: No cervical adenopathy. Skin:     General: Skin is warm and dry. Findings: No rash. Neurological:      Mental Status: She is alert and oriented to person, place, and time. Cranial Nerves: No cranial nerve deficit. Deep Tendon Reflexes: Reflexes are normal and symmetric. Psychiatric:         Speech: Speech normal.         Behavior: Behavior normal.         Thought Content:  Thought content normal.         Judgment: Judgment normal.           Assessment and Plan:      Dementia ([address]   Cont f/u w Dr. Boyce Borne     Type 2 diabetes mellitus without complication, without long-term current use of insulin ([address]   Cont f/u and watch carbs      Malignant neoplasm of ascending colon ([address]   Cont f/u w GI but will check to get report of last colonscopy 2020 per daughter       Episode of recurrent major depressive disorder ([address]   Controlled w current regimen- continue and monitor closely      Essential hypertension   Controlled w current regimen- continue and monitor closely      Pure hypercholesterolemia   Recheck before visit w     Asthma   Using inhaler rarely     Osteoporosis Cont f/u w endo       Encounter Diagnoses   Name Primary?  Pure hypercholesterolemia Yes    [facility]     Mild episode of recurrent major depressive disorder ([facility]     Malignant neoplasm of ascending colon (HCC)     Type 2 diabetes mellitus without complication, without long-term current use of insulin (HCC)     Fatigue, unspecified type     Essential hypertension     Moderate persistent asthma without complication     Age-related osteoporosis without current pathological fracture     Medicare annual wellness visit, subsequent     Encounter for screening mammogram for malignant neoplasm of breast        Orders Placed This Encounter   Procedures    ERWIN DIGITAL SCREEN W OR WO CAD BILATERAL     Standing Status:   Future     Standing Expiration Date:   4/8/2023     Scheduling Instructions:      Call [phone] then option #2 then option #1 to schedule    Lipid Panel     Standing Status:   Future     Standing Expiration Date:   4/8/2023     Order Specific Question:   Is Patient Fasting?/# of Hours     Answer:   yes/10    CBC with Auto Differential     Standing Status:   Future     Standing Expiration Date:   4/8/2023           Medicare Annual Wellness Visit    Darius Mo is here for Medicare AWV (review labs)    Assessment & Plan    Recommendations for Preventive Services Due: see orders and patient instructions/AVS.  Recommended screening schedule for the next 5-10 years is provided to the patient in written form: see Patient Instructions/AVS.     Return for Medicare Annual Wellness Visit in 1 year. Subjective   The following acute and/or chronic problems were also addressed today:  See note     Patient's complete Health Risk Assessment and screening values have been reviewed and are found in Flowsheets. The following problems were reviewed today and where indicated follow up appointments were made and/or referrals ordered.     Positive Risk Factor Screenings with Interventions:    Fall Risk:  Do you feel unsteady or are you worried about falling? : no  2 or more falls in past year?: no  Fall with injury in past year?: (!) yes     Fall Risk Interventions:    · Patient declines any further evaluation/treatment for this issue              General Health and ACP:  General  In general, how would you say your health is?: Very Good  In the past 7 days, have you experienced any of the following: New or Increased Pain, New or Increased Fatigue, Loneliness, Social Isolation, Stress or Anger?: No  Do you get the social and emotional support that you need?: Yes  Do you have a Living Will?: (!) No    Advance Directives     Power of  Living Will ACP-Advance Directive ACP-Power of Holly Cb on 03/25/22 Not on File Filed [address] Interventions:  · No Living Will: Patient declines ACP discussion/assistance    Health Habits/Nutrition:     Physical Activity: Insufficiently Active    Days of Exercise per Week: 3 days    Minutes of Exercise per Session: 20 min     Have you lost any weight without trying in the past 3 months?: No  Body mass index: (!) 27.1  Have you seen the dentist within the past year?: (!) No    Health Habits/Nutrition Interventions:  · Dental exam overdue:  patient encouraged to make appointment with his/her dentist    Hearing/Vision:  Do you or your family notice any trouble with your hearing that hasn't been managed with hearing aids?: No  Do you have difficulty driving, watching TV, or doing any of your daily activities because of your eyesight?: No  Have you had an eye exam within the past year?: (!) No  No exam data present    Hearing/Vision Interventions:  · Vision concerns:  patient encouraged to make appointment with his/her eye specialist     ADLs:  In the past 7 days, did you need help from others to perform any of the following everyday activities: Eating, dressing, grooming, bathing, toileting, or walking/balance?: No  In the past 7 days, did you need help from others to take care of any of the following: Laundry, housekeeping, banking/finances, shopping, telephone use, food preparation, transportation, or taking medications?: (!) Yes  Select all that apply: (!) Food Preparation,Taking Medications    ADL Interventions:  · Patient declines any further evaluation/treatment for this issue          Objective   Vitals:    04/08/22 1150   BP: 138/80   Temp: 97.3 °F (36.3 °C)   Weight: 153 lb (69.4 kg)   Height: 5' 3\" (1.6 m)      Body mass index is 27.1 kg/m². See note        Allergies   Allergen Reactions    Dye [Iodides] Anaphylaxis    Iodine Hives, Shortness Of Breath and Itching    Azithromycin      intolerance    Codeine Nausea And Vomiting     Headache severe     Prior to Visit Medications    Medication Sig Taking? Authorizing Provider   alendronate (FOSAMAX) 70 MG tablet TAKE ONE TABLET BY MOUTH EVERY SEVEN DAYS  Jenny Tilley MD   galantamine (RAZADYNE) 12 MG tablet Take 1 tablet by mouth 2 times daily  Larry Solis MD   FLUoxetine (PROZAC) 20 MG capsule Take 1 capsule by mouth daily  Larry Solis MD   lisinopril (PRINIVIL;ZESTRIL) 5 MG tablet Take 1 tablet by mouth daily  Larry Solis MD   albuterol sulfate HFA (PROAIR HFA) 108 (90 Base) MCG/ACT inhaler Inhale 2 puffs into the lungs every 6 hours as needed for Wheezing  Larry Solis MD   fluticasone (FLOVENT HFA) 110 MCG/ACT inhaler INHALE 2 PUFFS TWICE A DAY AS DIRECTED  Larry Solis MD   fluticasone (FLONASE) 50 MCG/ACT nasal spray One spray each nostril daily  Larry Solis MD   Cholecalciferol (VITAMIN D3) 1000 UNITS CAPS Take 1 capsule by mouth daily  Historical Provider, MD   vitamin B-12 (CYANOCOBALAMIN) 500 MCG tablet Take 500 mcg by mouth daily  Historical Provider, MD   Multiple Vitamins-Minerals (THERAPEUTIC MULTIVITAMIN-MINERALS) tablet Take 1 tablet by mouth daily. Historical Provider, MD   Biotin 1 MG CAPS Take 2 tablets by mouth daily.   Historical Provider, MD   Cranberry 125 MG TABS Take 1 tablet by mouth.   Historical Provider, MD   loratadine (CLARITIN) 10 MG tablet Take 10 mg by mouth daily as needed   Melani Patel MD       [facility] (Including outside providers/suppliers regularly involved in providing care):   Patient Care Team:  Melani Patel MD as PCP - Betsy Ortega MD as PCP - Hematology/Oncology (Hematology and Oncology)  Melani Patel MD as PCP - [facility] Empaneled Provider  Shai Bird MD as Surgeon (General Surgery)    Reviewed and updated this visit:  Tobacco  Allergies  Meds  Problems  Med Hx  Surg Hx  Soc Hx  Fam Hx

## 2022-04-08 NOTE — TELEPHONE ENCOUNTER
Can we call [facility] and get report of last colonoscopy and when to have repeat-we think she had it done in 2020

## 2022-06-20 DIAGNOSIS — M81.0 AGE-RELATED OSTEOPOROSIS WITHOUT CURRENT PATHOLOGICAL FRACTURE: ICD-10-CM

## 2022-06-20 RX ORDER — FLUOXETINE HYDROCHLORIDE 20 MG/1
20 CAPSULE ORAL DAILY
Qty: 90 CAPSULE | Refills: 0 | Status: SHIPPED | OUTPATIENT
Start: 2022-06-20

## 2022-06-20 RX ORDER — GALANTAMINE HYDROBROMIDE 12 MG/1
12 TABLET, FILM COATED ORAL 2 TIMES DAILY
Qty: 180 TABLET | Refills: 0 | Status: SHIPPED | OUTPATIENT
Start: 2022-06-20

## 2022-06-20 RX ORDER — LISINOPRIL 5 MG/1
5 TABLET ORAL DAILY
Qty: 90 TABLET | Refills: 0 | Status: SHIPPED | OUTPATIENT
Start: 2022-06-20

## 2022-06-20 RX ORDER — ALENDRONATE SODIUM 70 MG/1
70 TABLET ORAL
Qty: 12 TABLET | Refills: 0 | Status: SHIPPED | OUTPATIENT
Start: 2022-06-20

## 2022-06-20 RX ORDER — CHOLECALCIFEROL (VITAMIN D3) 125 MCG
500 CAPSULE ORAL DAILY
Qty: 90 TABLET | Refills: 0 | Status: SHIPPED | OUTPATIENT
Start: 2022-06-20

## 2022-06-20 NOTE — TELEPHONE ENCOUNTER
Refill    galantamine (RAZADYNE) 12 MG tablet     lisinopril (PRINIVIL;ZESTRIL) 5 MG tablet    alendronate (FOSAMAX) 70 MG tablet     FLUoxetine (PROZAC) 20 MG capsule     Cholecalciferol (VITAMIN D3) 1000 UNITS CAPS     vitamin B-12 (CYANOCOBALAMIN) 500 MCG tablet         [address] [phone] - F [phone] [address] [facility] [address]   Phone:  [phone]  Fax:  [phone]

## 2022-08-29 ENCOUNTER — OFFICE VISIT (OUTPATIENT)
Dept: INTERNAL MEDICINE CLINIC | Age: 77
End: 2022-08-29
Payer: MEDICARE

## 2022-08-29 VITALS
TEMPERATURE: 97.4 F | BODY MASS INDEX: 28.53 KG/M2 | SYSTOLIC BLOOD PRESSURE: 118 MMHG | DIASTOLIC BLOOD PRESSURE: 80 MMHG | HEIGHT: 63 IN | HEART RATE: 86 BPM | WEIGHT: 161 LBS | OXYGEN SATURATION: 96 %

## 2022-08-29 DIAGNOSIS — J30.9 ALLERGIC RHINITIS, UNSPECIFIED SEASONALITY, UNSPECIFIED TRIGGER: ICD-10-CM

## 2022-08-29 DIAGNOSIS — C18.2 MALIGNANT NEOPLASM OF ASCENDING COLON (HCC): ICD-10-CM

## 2022-08-29 DIAGNOSIS — I10 ESSENTIAL HYPERTENSION: Primary | ICD-10-CM

## 2022-08-29 DIAGNOSIS — R01.1 CARDIAC MURMUR: ICD-10-CM

## 2022-08-29 DIAGNOSIS — G30.1 LATE ONSET ALZHEIMER'S DEMENTIA WITHOUT BEHAVIORAL DISTURBANCE (HCC): ICD-10-CM

## 2022-08-29 DIAGNOSIS — F02.80 LATE ONSET ALZHEIMER'S DEMENTIA WITHOUT BEHAVIORAL DISTURBANCE (HCC): ICD-10-CM

## 2022-08-29 DIAGNOSIS — M81.0 AGE-RELATED OSTEOPOROSIS WITHOUT CURRENT PATHOLOGICAL FRACTURE: ICD-10-CM

## 2022-08-29 DIAGNOSIS — J45.20 MILD INTERMITTENT ASTHMA WITHOUT COMPLICATION: ICD-10-CM

## 2022-08-29 PROBLEM — M25.559 HIP PAIN: Status: RESOLVED | Noted: 2019-09-17 | Resolved: 2022-08-29

## 2022-08-29 PROBLEM — E11.9 TYPE 2 DIABETES MELLITUS (HCC): Status: ACTIVE | Noted: 2022-08-29

## 2022-08-29 PROCEDURE — 1036F TOBACCO NON-USER: CPT | Performed by: INTERNAL MEDICINE

## 2022-08-29 PROCEDURE — G8417 CALC BMI ABV UP PARAM F/U: HCPCS | Performed by: INTERNAL MEDICINE

## 2022-08-29 PROCEDURE — G8427 DOCREV CUR MEDS BY ELIG CLIN: HCPCS | Performed by: INTERNAL MEDICINE

## 2022-08-29 PROCEDURE — 99214 OFFICE O/P EST MOD 30 MIN: CPT | Performed by: INTERNAL MEDICINE

## 2022-08-29 PROCEDURE — 1090F PRES/ABSN URINE INCON ASSESS: CPT | Performed by: INTERNAL MEDICINE

## 2022-08-29 PROCEDURE — 1123F ACP DISCUSS/DSCN MKR DOCD: CPT | Performed by: INTERNAL MEDICINE

## 2022-08-29 PROCEDURE — G8399 PT W/DXA RESULTS DOCUMENT: HCPCS | Performed by: INTERNAL MEDICINE

## 2022-08-29 ASSESSMENT — ENCOUNTER SYMPTOMS: SHORTNESS OF BREATH: 0

## 2022-08-29 NOTE — ASSESSMENT & PLAN NOTE
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

## 2022-08-29 NOTE — PROGRESS NOTES
[facility] Internal Medicine  Establish care visit   2022    Shawna Alberts (:  1945) kirill 69 y.o. female, here to establish care. Chief Complaint   Patient presents with    Established New Doctor        Patient Active Problem List   Diagnosis    Allergic rhinitis    Congenital renal agenesis and dysgenesis    Esophageal reflux    Pure hypercholesterolemia    Asthma    Essential hypertension    Benign essential tremor    Malignant neoplasm of skin    Vitamin D deficiency    Osteoporosis    Malignant neoplasm of ascending colon ([facility]    Cardiac murmur    Lumbar disc disease    Hearing disorder of both ears    Episode of recurrent major depressive disorder ([facility]    Dementia ([facility]       ASSESSMENT/ PLAN:    Essential hypertension  Well controlled  -continue lisinopril 5 mg  -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 Will not renew Flovent, continue albuterol prn. Allergic rhinitis  -takes OTC Claritin not regulatory . The possible association of long-term antihistamine use to cognitive decline, if allergies become uncontrolled let me know and we will try nasal spray    Dementia ([facility]  -unclear underlying diagnosis  -sees Dr. Dariusz Samuel , I am unable to find notes, will reach out to obtain  -sees NP every 2 months  -on prozac 20mg, for anxiety   -on galantamine, explained this should be prescribed by Dr. Dariusz Samuel if feels appropriated, I am not alber what is her underlying diagnosis. Osteoporosis  -sees Dr. Jame Siegel  -on fosamax since   -last dexa   -continue fosomax    Malignant neoplasm of ascending colon ([facility]  -last colonoscopy ?  Will reach out to GI, dr Lyndsay Mercer  -s/p resection  -unclear who is her onclogist     Cardiac murmur  -will get echo  -also noted mild swelling on exam today but no other cardiac complaints Orders Placed This Encounter   Procedures    Basic Metabolic Panel    Vitamin B12    MICROALBUMIN / CREATININE URINE RATIO    ECHO 2D WO Color Doppler Complete     No orders of the defined types were placed in this encounter. Medications Discontinued During This Encounter   Medication Reason    fluticasone (FLONASE) 50 MCG/ACT nasal spray ERROR    fluticasone (FLOVENT HFA) 110 MCG/ACT inhaler Therapy completed    loratadine (CLARITIN) 10 MG tablet Therapy completed        No follow-ups on file. HPI  50-year-old woman with history of hypertension, hyperlipidemia,, history of colon cancer, dementia, osteoporosis, establishing care. Prior patient of Dr. Clarence Jean who retired. Here with her daughter Ricardo Pedroza. She lives in assisted living facility for the last year and is overall doing well. She follows with neuropsych every 2 months for dementia and when endocrinology for osteoporosis      HISTORIES  Current Outpatient Medications on File Prior to Visit   Medication Sig Dispense Refill    lisinopril (PRINIVIL;ZESTRIL) 5 MG tablet Take 1 tablet by mouth daily 90 tablet 0    galantamine (RAZADYNE) 12 MG tablet Take 1 tablet by mouth 2 times daily 180 tablet 0    alendronate (FOSAMAX) 70 MG tablet Take 1 tablet by mouth every 7 days 12 tablet 0    FLUoxetine (PROZAC) 20 MG capsule Take 1 capsule by mouth daily 90 capsule 0    Cholecalciferol (VITAMIN D3) 25 MCG (1000 UT) CAPS Take 1 capsule by mouth daily 90 capsule 0    vitamin B-12 (CYANOCOBALAMIN) 500 MCG tablet Take 1 tablet by mouth daily 90 tablet 0    albuterol sulfate HFA (PROAIR HFA) 108 (90 Base) MCG/ACT inhaler Inhale 2 puffs into the lungs every 6 hours as needed for Wheezing 1 Inhaler 5    Multiple Vitamins-Minerals (THERAPEUTIC MULTIVITAMIN-MINERALS) tablet Take 1 tablet by mouth daily. Biotin 1 MG CAPS Take 2 tablets by mouth daily. Cranberry 125 MG TABS Take 1 tablet by mouth.        No current facility-administered medications on file prior to visit. Allergies   Allergen Reactions    Dye [Iodides] Anaphylaxis    Iodine Hives, Shortness Of Breath and Itching    Azithromycin      intolerance    Codeine Nausea And Vomiting     Headache severe     Past Medical History:   Diagnosis Date    Allergic     Allergic rhinitis, cause unspecified     Anemia     Asymptomatic varicose veins     Benign neoplasm of skin, site unspecified     Cancer (HCC)     Closed fracture of hip ([facility] 2017    Left hip fracture 2017 s/p surgery w pin placement     Congenital renal agenesis and dysgenesis     Edema     GERD (gastroesophageal reflux disease)     Osteopetrosis     Pain in limb     Symptomatic menopausal or female climacteric states     Tobacco use disorder     Unspecified asthma(493.90)     allergy induced    Unspecified essential hypertension      Patient Active Problem List   Diagnosis    Allergic rhinitis    Congenital renal agenesis and dysgenesis    Esophageal reflux    Pure hypercholesterolemia    Asthma    Essential hypertension    Benign essential tremor    Malignant neoplasm of skin    Vitamin D deficiency    Osteoporosis    Malignant neoplasm of ascending colon (HCC)    Cardiac murmur    Lumbar disc disease    Hearing disorder of both ears    Episode of recurrent major depressive disorder ([facility]    Dementia ([facility]     Past Surgical History:   Procedure Laterality Date    APPENDECTOMY       SECTION  x3    COLECTOMY  2015    HERNIA REPAIR  2004    HYSTERECTOMY, TOTAL ABDOMINAL (CERVIX REMOVED)  2006    OTHER SURGICAL HISTORY  09/10/2015    LAPAROSCOPIC ASSISTED RIGHT HEMICOLECTOMY. PLACEMENT OF PAIN     Social History     Socioeconomic History    Marital status:       Spouse name: Not on file    Number of children: Not on file    Years of education: Not on file    Highest education level: Not on file   Occupational History    Not on file   Tobacco Use    Smoking status: Former     Packs/day: 0.50     Years: 15.00     Pack years: 7.50     Types: Cigarettes     Quit date: 1981     Years since quittin.3    Smokeless tobacco: Never   Vaping Use    Vaping Use: Never used   Substance and Sexual Activity    Alcohol use: No     Alcohol/week: 0.0 standard drinks    Drug use: No    Sexual activity: Yes     Partners: Male   Other Topics Concern    Not on file   Social History Narrative    Not on file     Social Determinants of Health     Financial Resource Strain: Low Risk     Difficulty of Paying Living Expenses: Not hard at all   Food Insecurity: No Food Insecurity    Worried About Running Out of Food in the Last Year: Never true    Ran Out of Food in the Last Year: Never true   Transportation Needs: Not on file   Physical Activity: Insufficiently Active    Days of Exercise per Week: 3 days    Minutes of Exercise per Session: 20 min   Stress: Not on file   Social Connections: Not on file   Intimate Partner Violence: Not on file   Housing Stability: Not on file      Family History   Problem Relation Age of Onset    Alzheimer's Disease Mother     Coronary Art Dis Father     Diabetes Father     Hypertension Father     Elevated Lipids Father     Asthma Sister     Asthma Sister     Cancer Maternal Aunt         breast         ROS  Review of Systems   Respiratory:  Negative for shortness of breath. Cardiovascular:  Positive for leg swelling. Negative for chest pain. PE  Vitals:    22 1105   BP: 118/80   Pulse: 86   Temp: 97.4 °F (36.3 °C)   SpO2: 96%   Weight: 161 lb (73 kg)   Height: 5' 3\" (1.6 m)     Estimated body mass index is 28.52 kg/m² as calculated from the following:    Height as of this encounter: 5' 3\" (1.6 m). Weight as of this encounter: 161 lb (73 kg). Physical Exam  Vitals reviewed. Constitutional:       General: She is not in acute distress. Appearance: Normal appearance. She is well-developed. She is not ill-appearing, toxic-appearing or diaphoretic. HENT:      Head: Normocephalic and atraumatic. Eyes:      General: No scleral icterus. Conjunctiva/sclera: Conjunctivae normal.      Pupils: Pupils are equal, round, and reactive to light. Neck:      Comments: Supraclavicular fullness  Cardiovascular:      Rate and Rhythm: Normal rate and regular rhythm. Heart sounds: Normal heart sounds. Pulmonary:      Effort: Pulmonary effort is normal. No respiratory distress. Breath sounds: Normal breath sounds. Musculoskeletal:      Cervical back: Normal range of motion and neck supple. Right lower leg: Edema present. Left lower leg: Edema present. Skin:     General: Skin is warm and dry. Coloration: Skin is not jaundiced. Neurological:      Mental Status: She is alert and oriented to person, place, and time. Psychiatric:         Behavior: Behavior normal.       Immunization History   Administered Date(s) Administered    COVID-19, MODERNA BLUE border, Primary or Immunocompromised, (age 12y+), IM, 100 mcg/0.5mL 02/15/2021, 03/16/2021, 11/16/2021    DTP 05/11/2006    Pneumococcal Conjugate 13-valent (Lyssa Mikel) 06/15/2017    Pneumococcal Polysaccharide ([id number]) 04/09/2012    Tetanus 05/09/2006       Health Maintenance   Topic Date Due    Shingles vaccine (1 of 2) Never done    DTaP/Tdap/Td vaccine (2 - Tdap) 05/11/2016    COVID-19 Vaccine (4 - Booster for Moderna series) 03/16/2022    Flu vaccine (1) 09/01/2022    Depression Monitoring  04/08/2023    Annual Wellness Visit (AWV)  04/09/2023    DEXA (modify frequency per FRAX score)  Completed    Pneumococcal 65+ years Vaccine  Completed    Hepatitis C screen  Completed    Hepatitis A vaccine  Aged Out    Hib vaccine  Aged Out    Meningococcal (ACWY) vaccine  Aged Out       PSH, PMH, SH and FH reviewed and noted. Recent and past labs, tests and consults also reviewed. Recent or new meds also reviewed. Mirna Quiñones MD    This dictation was generated by voice recognition computer software.   Although all attempts are made to edit the dictation for accuracy, there may be errors in the transcription that are not intended.

## 2022-08-29 NOTE — ASSESSMENT & PLAN NOTE
-stopped using Flovent  -rare albuterol use (mostly allergy triggered which is better in current home). Will not renew Flovent, continue albuterol prn.

## 2022-08-29 NOTE — ASSESSMENT & PLAN NOTE
-last colonoscopy 2018?  Will reach out to GI, dr David Sparks  -s/p resection  -unclear who is her onclogist

## 2022-09-01 ENCOUNTER — TELEPHONE (OUTPATIENT)
Dept: INTERNAL MEDICINE CLINIC | Age: 77
End: 2022-09-01

## 2022-09-01 DIAGNOSIS — R01.1 CARDIAC MURMUR: Primary | ICD-10-CM

## 2022-09-01 NOTE — TELEPHONE ENCOUNTER
Patient's daughter called in stating that the order for the ECHO is not correct. It needs be with W color. The order is placed WO color. Patient's daughter also went to previous office and signed records released form. Pls call and advise.

## 2022-09-06 ENCOUNTER — TELEPHONE (OUTPATIENT)
Dept: INTERNAL MEDICINE CLINIC | Age: 77
End: 2022-09-06

## 2022-09-07 PROBLEM — K57.90 DIVERTICULOSIS: Status: ACTIVE | Noted: 2022-09-07

## 2022-10-03 PROBLEM — N18.31 STAGE 3A CHRONIC KIDNEY DISEASE (CKD) (HCC): Status: ACTIVE | Noted: 2022-10-03

## 2022-11-02 ENCOUNTER — HOSPITAL ENCOUNTER (OUTPATIENT)
Dept: NON INVASIVE DIAGNOSTICS | Age: 77
Discharge: HOME | End: 2022-11-02
Payer: MEDICARE

## 2022-11-02 LAB
LV EF: 58 %
LVEF MODALITY: NORMAL

## 2022-11-02 PROCEDURE — 93306 TTE W/DOPPLER COMPLETE: CPT

## 2022-11-29 ENCOUNTER — OFFICE VISIT (OUTPATIENT)
Dept: INTERNAL MEDICINE CLINIC | Age: 77
End: 2022-11-29
Payer: MEDICARE

## 2022-11-29 VITALS
HEIGHT: 63 IN | OXYGEN SATURATION: 97 % | HEART RATE: 76 BPM | TEMPERATURE: 96.4 F | SYSTOLIC BLOOD PRESSURE: 135 MMHG | WEIGHT: 162 LBS | DIASTOLIC BLOOD PRESSURE: 85 MMHG | BODY MASS INDEX: 28.7 KG/M2

## 2022-11-29 DIAGNOSIS — E53.8 B12 DEFICIENCY: ICD-10-CM

## 2022-11-29 DIAGNOSIS — I10 ESSENTIAL HYPERTENSION: ICD-10-CM

## 2022-11-29 DIAGNOSIS — G30.1 MILD LATE ONSET ALZHEIMER'S DEMENTIA WITH ANXIETY (HCC): ICD-10-CM

## 2022-11-29 DIAGNOSIS — E11.9 TYPE 2 DIABETES MELLITUS WITHOUT COMPLICATION, WITHOUT LONG-TERM CURRENT USE OF INSULIN (HCC): ICD-10-CM

## 2022-11-29 DIAGNOSIS — E78.00 PURE HYPERCHOLESTEROLEMIA: Primary | ICD-10-CM

## 2022-11-29 DIAGNOSIS — F02.A4 MILD LATE ONSET ALZHEIMER'S DEMENTIA WITH ANXIETY (HCC): ICD-10-CM

## 2022-11-29 DIAGNOSIS — N18.31 STAGE 3A CHRONIC KIDNEY DISEASE (CKD) (HCC): ICD-10-CM

## 2022-11-29 PROCEDURE — G8417 CALC BMI ABV UP PARAM F/U: HCPCS | Performed by: INTERNAL MEDICINE

## 2022-11-29 PROCEDURE — G8427 DOCREV CUR MEDS BY ELIG CLIN: HCPCS | Performed by: INTERNAL MEDICINE

## 2022-11-29 PROCEDURE — 1036F TOBACCO NON-USER: CPT | Performed by: INTERNAL MEDICINE

## 2022-11-29 PROCEDURE — 1090F PRES/ABSN URINE INCON ASSESS: CPT | Performed by: INTERNAL MEDICINE

## 2022-11-29 PROCEDURE — 99214 OFFICE O/P EST MOD 30 MIN: CPT | Performed by: INTERNAL MEDICINE

## 2022-11-29 PROCEDURE — 3078F DIAST BP <80 MM HG: CPT | Performed by: INTERNAL MEDICINE

## 2022-11-29 PROCEDURE — G8484 FLU IMMUNIZE NO ADMIN: HCPCS | Performed by: INTERNAL MEDICINE

## 2022-11-29 PROCEDURE — 1123F ACP DISCUSS/DSCN MKR DOCD: CPT | Performed by: INTERNAL MEDICINE

## 2022-11-29 PROCEDURE — G8399 PT W/DXA RESULTS DOCUMENT: HCPCS | Performed by: INTERNAL MEDICINE

## 2022-11-29 PROCEDURE — 3074F SYST BP LT 130 MM HG: CPT | Performed by: INTERNAL MEDICINE

## 2022-11-29 ASSESSMENT — PATIENT HEALTH QUESTIONNAIRE - PHQ9
3. TROUBLE FALLING OR STAYING ASLEEP: 0
7. TROUBLE CONCENTRATING ON THINGS, SUCH AS READING THE NEWSPAPER OR WATCHING TELEVISION: 0
SUM OF ALL RESPONSES TO PHQ QUESTIONS 1-9: 0
1. LITTLE INTEREST OR PLEASURE IN DOING THINGS: 0
9. THOUGHTS THAT YOU WOULD BE BETTER OFF DEAD, OR OF HURTING YOURSELF: 0
SUM OF ALL RESPONSES TO PHQ9 QUESTIONS 1 & 2: 0
SUM OF ALL RESPONSES TO PHQ QUESTIONS 1-9: 0
8. MOVING OR SPEAKING SO SLOWLY THAT OTHER PEOPLE COULD HAVE NOTICED. OR THE OPPOSITE, BEING SO FIGETY OR RESTLESS THAT YOU HAVE BEEN MOVING AROUND A LOT MORE THAN USUAL: 0
10. IF YOU CHECKED OFF ANY PROBLEMS, HOW DIFFICULT HAVE THESE PROBLEMS MADE IT FOR YOU TO DO YOUR WORK, TAKE CARE OF THINGS AT HOME, OR GET ALONG WITH OTHER PEOPLE: 0
SUM OF ALL RESPONSES TO PHQ QUESTIONS 1-9: 0
SUM OF ALL RESPONSES TO PHQ QUESTIONS 1-9: 0
4. FEELING TIRED OR HAVING LITTLE ENERGY: 0
2. FEELING DOWN, DEPRESSED OR HOPELESS: 0
5. POOR APPETITE OR OVEREATING: 0
6. FEELING BAD ABOUT YOURSELF - OR THAT YOU ARE A FAILURE OR HAVE LET YOURSELF OR YOUR FAMILY DOWN: 0

## 2022-11-29 ASSESSMENT — ENCOUNTER SYMPTOMS: SHORTNESS OF BREATH: 0

## 2022-11-29 NOTE — ASSESSMENT & PLAN NOTE
-diagnosed with alzheimer, mostly struggles with word finding   -sees Dr. Ken Dolan and NP, recently this month. No recent med change  -on prozac 20mg, for anxiety, galantamine   -On B12 supplement, last level more than sufficient.   Repeat B12 (do not take supplement on testing morning)

## 2022-11-29 NOTE — PROGRESS NOTES
[facility]  Follow up visit   2022    Denise wAan (:  1945) is a 69 y.o. female, here for evaluation of the following medical concerns:    Chief Complaint   Patient presents with    Follow-up     follow up/Late onset Alzheimer's dementia without behavioral disturbance [facility])        ASSESSMENT/ PLAN:  Dementia ([facility] [address]  -diagnosed with alzheimer, mostly struggles with word finding   -sees Dr. Giorgi Hinojosa and NP, recently this month. No recent med change  -on prozac 20mg, for anxiety, galantamine   -On B12 supplement, last level more than sufficient. Repeat B12 (do not take supplement on testing morning)    Stage 3a chronic kidney disease (CKD) ([facility])  -baseline GFR 48  -avoid NSAIDs. 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 There are no discontinued medications. No follow-ups on file. HPI  Her with her daughter [address]. Overall doing okay. Continues to follow regularly with geriatrician. No recent med changes. Mostly bothered by word finding difficulties.     HISTORIES   Current Outpatient Medications on File Prior to Visit   Medication Sig Dispense Refill    lisinopril (PRINIVIL;ZESTRIL) 5 MG tablet Take 1 tablet by mouth daily 90 tablet 0    galantamine (RAZADYNE) 12 MG tablet Take 1 tablet by mouth 2 times daily 180 tablet 0    alendronate (FOSAMAX) 70 MG tablet Take 1 tablet by mouth every 7 days 12 tablet 0    FLUoxetine (PROZAC) 20 MG capsule Take 1 capsule by mouth daily 90 capsule 0    Cholecalciferol (VITAMIN D3) 25 MCG (1000 UT) CAPS Take 1 capsule by mouth daily 90 capsule 0    vitamin B-12 (CYANOCOBALAMIN) 500 MCG tablet Take 1 tablet by mouth daily 90 tablet 0    albuterol sulfate HFA (PROAIR HFA) 108 (90 Base) MCG/ACT inhaler Inhale 2 puffs into the lungs every 6 hours as needed for Wheezing 1 Inhaler 5    Multiple Vitamins-Minerals (THERAPEUTIC MULTIVITAMIN-MINERALS) tablet Take 1 tablet by mouth daily. Biotin 1 MG CAPS Take 2 tablets by mouth daily. Cranberry 125 MG TABS Take 1 tablet by mouth. No current facility-administered medications on file prior to visit.        Allergies   Allergen Reactions    Dye [Iodides] Anaphylaxis    Iodine Hives, Shortness Of Breath and Itching    Azithromycin      intolerance    Codeine Nausea And Vomiting     Headache severe     Past Medical History:   Diagnosis Date    Allergic     Allergic rhinitis, cause unspecified     Anemia     Asymptomatic varicose veins     Benign neoplasm of skin, site unspecified     Cancer (HCC)     Closed fracture of hip ([address] 2017    Left hip fracture 2017 s/p surgery w pin placement     Congenital renal agenesis and dysgenesis     Edema     GERD (gastroesophageal reflux disease)     Osteopetrosis     Pain in limb     Symptomatic menopausal or female climacteric states     Tobacco use disorder     Unspecified asthma(493.90)     allergy induced    Unspecified essential hypertension      Patient Active Problem List   Diagnosis    Allergic rhinitis    Congenital renal agenesis and dysgenesis    Esophageal reflux    Pure hypercholesterolemia    Asthma    Essential hypertension    Benign essential tremor    Malignant neoplasm of skin    Vitamin D deficiency    Osteoporosis    Malignant neoplasm of ascending colon (HCC)    Cardiac murmur    Lumbar disc disease    Hearing disorder of both ears    Episode of recurrent major depressive disorder ([address]    Dementia ([address]    Diverticulosis    Stage 3a chronic kidney disease (CKD) ([address]     Past Surgical History:   Procedure Laterality Date    APPENDECTOMY       SECTION  x3    COLECTOMY  2015    HERNIA REPAIR  2004 HYSTERECTOMY, TOTAL ABDOMINAL (CERVIX REMOVED)  2006    OTHER SURGICAL HISTORY  09/10/2015    LAPAROSCOPIC ASSISTED RIGHT HEMICOLECTOMY. PLACEMENT OF PAIN     Social History     Socioeconomic History    Marital status:      Spouse name: Not on file    Number of children: Not on file    Years of education: Not on file    Highest education level: Not on file   Occupational History    Not on file   Tobacco Use    Smoking status: Former     Packs/day: 0.50     Years: 15.00     Pack years: 7.50     Types: Cigarettes     Quit date: 1981     Years since quittin.5    Smokeless tobacco: Never   Vaping Use    Vaping Use: Never used   Substance and Sexual Activity    Alcohol use: No     Alcohol/week: 0.0 standard drinks    Drug use: No    Sexual activity: Yes     Partners: Male   Other Topics Concern    Not on file   Social History Narrative    Not on file     Social Determinants of Health     Financial Resource Strain: Low Risk     Difficulty of Paying Living Expenses: Not hard at all   Food Insecurity: No Food Insecurity    Worried About Running Out of Food in the Last Year: Never true    [address] in the Last Year: Never true   Transportation Needs: Not on file   Physical Activity: Insufficiently Active    Days of Exercise per Week: 3 days    Minutes of Exercise per Session: 20 min   Stress: Not on file   Social Connections: Not on file   Intimate Partner Violence: Not on file   Housing Stability: Not on file      Family History   Problem Relation Age of Onset    Alzheimer's Disease Mother     Coronary Art Dis Father     Diabetes Father     Hypertension Father     Elevated Lipids Father     Asthma Sister     Asthma Sister     Cancer Maternal Aunt         breast       ROS  Review of Systems   Respiratory:  Negative for shortness of breath. Cardiovascular:  Positive for leg swelling. Negative for chest pain and palpitations.    Psychiatric/Behavioral:          Memory changes     PE  Vitals: 11/29/22 1108 11/29/22 1118   BP: (!) 138/90 135/85   Site: Left Upper Arm    Position: Sitting    Cuff Size: Small Adult    Pulse: 76    Temp: (!) 96.4 °F (35.8 °C)    SpO2: 97%    Weight: 162 lb (73.5 kg)    Height: 5' 3\" (1.6 m)      Estimated body mass index is 28.7 kg/m² as calculated from the following:    Height as of this encounter: 5' 3\" (1.6 m). Weight as of this encounter: 162 lb (73.5 kg). Physical Exam  Vitals reviewed. Constitutional:       General: She is not in acute distress. Appearance: Normal appearance. She is well-developed. She is not ill-appearing, toxic-appearing or diaphoretic. HENT:      Head: Normocephalic and atraumatic. Eyes:      General: No scleral icterus. Conjunctiva/sclera: Conjunctivae normal.      Pupils: Pupils are equal, round, and reactive to light. Cardiovascular:      Rate and Rhythm: Normal rate and regular rhythm. Heart sounds: Murmur heard. Pulmonary:      Effort: Pulmonary effort is normal. No respiratory distress. Breath sounds: Normal breath sounds. Abdominal:      General: There is no distension. Palpations: Abdomen is soft. Tenderness: There is no abdominal tenderness. Musculoskeletal:      Cervical back: Normal range of motion and neck supple. Right lower leg: Edema present. Left lower leg: Edema present. Skin:     General: Skin is warm and dry. Coloration: Skin is not jaundiced. Neurological:      Mental Status: She is alert and oriented to person, place, and time. Psychiatric:         Behavior: Behavior normal.         Trevon Walker MD    This dictation was generated by voice recognition computer software. Although all attempts are made to edit the dictation for accuracy, there may be errors in the transcription that are not intended.

## 2022-12-07 ENCOUNTER — OFFICE VISIT (OUTPATIENT)
Dept: ENDOCRINOLOGY | Age: 77
End: 2022-12-07
Payer: MEDICARE

## 2022-12-07 VITALS
DIASTOLIC BLOOD PRESSURE: 81 MMHG | HEART RATE: 83 BPM | HEIGHT: 63 IN | RESPIRATION RATE: 16 BRPM | WEIGHT: 165 LBS | BODY MASS INDEX: 29.23 KG/M2 | SYSTOLIC BLOOD PRESSURE: 192 MMHG

## 2022-12-07 DIAGNOSIS — E55.9 VITAMIN D DEFICIENCY: Primary | ICD-10-CM

## 2022-12-07 DIAGNOSIS — M81.0 AGE-RELATED OSTEOPOROSIS WITHOUT CURRENT PATHOLOGICAL FRACTURE: ICD-10-CM

## 2022-12-07 DIAGNOSIS — I10 ESSENTIAL HYPERTENSION: ICD-10-CM

## 2022-12-07 DIAGNOSIS — E78.00 PURE HYPERCHOLESTEROLEMIA: ICD-10-CM

## 2022-12-07 PROCEDURE — 1090F PRES/ABSN URINE INCON ASSESS: CPT | Performed by: INTERNAL MEDICINE

## 2022-12-07 PROCEDURE — 99214 OFFICE O/P EST MOD 30 MIN: CPT | Performed by: INTERNAL MEDICINE

## 2022-12-07 PROCEDURE — 3078F DIAST BP <80 MM HG: CPT | Performed by: INTERNAL MEDICINE

## 2022-12-07 PROCEDURE — 1123F ACP DISCUSS/DSCN MKR DOCD: CPT | Performed by: INTERNAL MEDICINE

## 2022-12-07 PROCEDURE — 3074F SYST BP LT 130 MM HG: CPT | Performed by: INTERNAL MEDICINE

## 2022-12-07 PROCEDURE — 1036F TOBACCO NON-USER: CPT | Performed by: INTERNAL MEDICINE

## 2022-12-07 PROCEDURE — G8399 PT W/DXA RESULTS DOCUMENT: HCPCS | Performed by: INTERNAL MEDICINE

## 2022-12-07 PROCEDURE — G8427 DOCREV CUR MEDS BY ELIG CLIN: HCPCS | Performed by: INTERNAL MEDICINE

## 2022-12-07 PROCEDURE — G8417 CALC BMI ABV UP PARAM F/U: HCPCS | Performed by: INTERNAL MEDICINE

## 2022-12-07 PROCEDURE — G8484 FLU IMMUNIZE NO ADMIN: HCPCS | Performed by: INTERNAL MEDICINE

## 2022-12-07 RX ORDER — ALENDRONATE SODIUM 70 MG/1
70 TABLET ORAL
Qty: 12 TABLET | Refills: 3 | Status: SHIPPED | OUTPATIENT
Start: 2022-12-07

## 2022-12-07 NOTE — PROGRESS NOTES
HPI    Radha Gaming is seen here for management of osteoporosis she has a PMH of Osteoporosis, Asthma. 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 Had joint pains. FH of Osteoporosis: No   FH of hip fracture:No    Secondary causes of osteoprorsis: Menopause in late 45s.   ,No smoking, No prolonged steroid use. Calcium: 1200mg/d diet + 500 mg/d supplement = 1700 mg/d    __ Milk __ Cheese __ Yogurt __ Ice Cream __ Fortified OJ __ Other:     Vitamin D:  1000 IU/d     Follows up regularly with a dentist. No major dental procedures planned. She has hypertension and is on medication. She has been diagnosed with Dementia and is on medication. Moved into assisted living facility in July 2021    INTERIM     Has been getting her Fosamax weekly no apparent complaints from that.     Allergies   Allergen Reactions    Dye [Iodides] Anaphylaxis    Iodine Hives, Shortness Of Breath and Itching    Azithromycin      intolerance    Codeine Nausea And Vomiting     Headache severe     Outpatient Medications Marked as Taking for the 12/7/22 encounter (Office Visit) with Octavio Rodas MD   Medication Sig Dispense Refill    alendronate (FOSAMAX) 70 MG tablet Take 1 tablet by mouth every 7 days 12 tablet 3    lisinopril (PRINIVIL;ZESTRIL) 5 MG tablet Take 1 tablet by mouth daily 90 tablet 0    galantamine (RAZADYNE) 12 MG tablet Take 1 tablet by mouth 2 times daily 180 tablet 0    FLUoxetine (PROZAC) 20 MG capsule Take 1 capsule by mouth daily 90 capsule 0    Cholecalciferol (VITAMIN D3) 25 MCG (1000 UT) CAPS Take 1 capsule by mouth daily 90 capsule 0    vitamin B-12 (CYANOCOBALAMIN) 500 MCG tablet Take 1 tablet by mouth daily 90 tablet 0    albuterol sulfate HFA (PROAIR HFA) 108 (90 Base) MCG/ACT inhaler Inhale 2 puffs into the lungs every 6 hours as needed for Wheezing 1 Inhaler 5    Multiple Vitamins-Minerals (THERAPEUTIC MULTIVITAMIN-MINERALS) tablet Take 1 tablet by mouth daily. Biotin 1 MG CAPS Take 2 tablets by mouth daily. Cranberry 125 MG TABS Take 1 tablet by mouth. Vitals:    22 0928   BP: (!) 192/81   Pulse: 83   Resp: 16   Weight: 165 lb (74.8 kg)   Height: 5' 3\" (1.6 m)     Body mass index is 29.23 kg/m². Past Medical History:   Diagnosis Date    Allergic     Allergic rhinitis, cause unspecified     Anemia     Asymptomatic varicose veins     Benign neoplasm of skin, site unspecified     Cancer (HCC)     Closed fracture of hip ([facility] [address] 2017    Left hip fracture 2017 s/p surgery w pin placement     Congenital renal agenesis and dysgenesis     Edema     GERD (gastroesophageal reflux disease)     Osteopetrosis     Pain in limb     Symptomatic menopausal or female climacteric states     Tobacco use disorder     Unspecified asthma(493.90)     allergy induced    Unspecified essential hypertension      Past Surgical History:   Procedure Laterality Date    APPENDECTOMY       SECTION  x3    COLECTOMY  2015    HERNIA REPAIR  2004    HYSTERECTOMY, TOTAL ABDOMINAL (CERVIX REMOVED)  2006    OTHER SURGICAL HISTORY  09/10/2015    LAPAROSCOPIC ASSISTED RIGHT HEMICOLECTOMY.  PLACEMENT OF PAIN     Family History   Problem Relation Age of Onset    Alzheimer's Disease Mother     Coronary Art Dis Father     Diabetes Father     Hypertension Father     Elevated Lipids Father     Asthma Sister     Asthma Sister     Cancer Maternal Aunt         breast     Social History     Tobacco Use   Smoking Status Former    Packs/day: 0.50    Years: 15.00    Pack years: 7.50    Types: Cigarettes    Quit date: 1981    Years since quittin.6   Smokeless Tobacco Never      Social History     Substance and Sexual Activity   Alcohol Use No    Alcohol/week: 0.0 standard drinks     ROS   Review of system Please see the scanned document filled by the patient, reviewed  by me today. EXAM  Constitutional: no acute distress, well appearing, well nourished  Psychiatric: oriented to person, place and time, judgement, insight and normal, recent and remote memory and intact and mood, affect are normal  Skin: skin and subcutaneous tissue is normal without mass,   Head and Face: examination of head and face revealed no abnormalities  Eyes: no lid or conjunctival swelling, no erythema or discharge, pupils are normal,   Ears/Nose: external inspection of ears and nose revealed no abnormalities, hearing is grossly normal  Oropharynx/Mouth/Face: lips, tongue and gums are normal with no lesions, the voice quality was normal  Neck: neck is supple and symmetric, with midline trachea and no masses, thyroid is normal    Pulmonary: no increased work of breathing or signs of respiratory distress, lungs are clear to auscultation  Cardiovascular: normal heart rate and rhythm, normal S1 and S2,   Musculoskeletal: normal gait and station,   Neurological: normal coordination, normal general cortical function      EXAM: DEXA       INDICATION: Screening for osteoporosis, post menopausal estrogen deficiency       COMPARISON:  1/31/2019       REGION ASSESSED:       L Spine (L 1-4): BMD= 1.003 g/cm2, T-score= -0.4, Z-score= 1.9, %Change = 6.0%       R Hip: BMD= 0.612 g/cm2, T-score= -2.7, Z-score= -1.0, %Change = -12.6%   R Femoral Neck: BMD= 0.502 g/cm2, T-score= -2.1, Z-score= -1.1           FRAX REPORT (WHO 10 Year Fracture Risk Assesment): Not reported as some T-scores are at or below -2.5. Impression       1. Bone mineral density is osteoporotic for the right total hip and osteopenic for the right femoral neck.            Assessment/Plan        ---Osteoporosis    Regularly taking Fosamax weekly since July 2021  Patient and daughter now would like to switch to Prolia 6 monthly injections  Repeat DEXA scan in January 2022 showed T score of -2.5 in the right hip, with worsening of BMD in the spine. We will switch her to Prolia 6 monthly injections. Previously had normal PTH and normal serum calcium in the past.  DEXA scan in 12/19 showed lowest  T score  -2.7 at the R hip. Had left hip fracture in 2017 underwent hip replacement. 24 urine calcium was normal at 148 in December 2019  Advised 1200 mg of calcium ( diet+supplement ), optimizing vitamin D supplementation and weightbearing exercises    She could not tolerate alendronate in the past. She has been taking Fosamax weekly for the last 1.5 year. She tried Actonel in the past as well. Bilateral swelling around the neck area and history of AVN  We'll check 24-hour urine cortisol    ---HTN   Patient is on lisinopril follows with primary care physician  Denies any chest pain shortness of breath or headache. If develop the symptoms needs to go to the ER  Blood pressure was significantly elevated, advised patient to recheck at home and let the primary care physician know if still elevated. ---AVN of shoulder. Dementia  On medication  In an assisted living facility. Return in about 6 months (around 6/7/2023).

## 2022-12-19 ENCOUNTER — TELEPHONE (OUTPATIENT)
Dept: ENDOCRINOLOGY | Age: 77
End: 2022-12-19

## 2022-12-20 NOTE — TELEPHONE ENCOUNTER
Left VM for patient that her benefit verification came back for her prolia and her insurance will cover the medication. Patient can call back to schedule for the first week of January when we are back at the [facility] office.

## 2023-01-05 NOTE — TELEPHONE ENCOUNTER
Patient daughter Mouna Singer called in stating that the patient has tested positive last night for covid. Patient is in assisted living and had dementia and would like to know if she can the the antiviral. Symptoms include congestion. This has been going on for 3 days. Patient has not tried anything over the counter. [id number] [facility] [address] [phone] - F [phone]      Pls call and advise.

## 2023-01-20 ENCOUNTER — TELEPHONE (OUTPATIENT)
Dept: ENDOCRINOLOGY | Age: 78
End: 2023-01-20

## 2023-01-20 NOTE — TELEPHONE ENCOUNTER
Left VM for patient to notify her that she missed her prolia injection appt yesterday and seeing if we can get her rescheduled.

## 2023-03-20 RX ORDER — CRANBERRY FRUIT EXTRACT 250 MG
1 CAPSULE ORAL DAILY
COMMUNITY
Start: 2023-03-20

## 2023-03-20 RX ORDER — FLUTICASONE PROPIONATE 50 MCG
1 SPRAY, SUSPENSION (ML) NASAL DAILY PRN
Qty: 16 G | Refills: 3 | COMMUNITY
Start: 2023-03-20

## 2023-03-20 RX ORDER — FLUTICASONE PROPIONATE 110 UG/1
2 AEROSOL, METERED RESPIRATORY (INHALATION) 2 TIMES DAILY PRN
Qty: 12 G | Refills: 3
Start: 2023-03-20 | End: 2024-03-19

## 2023-03-23 ENCOUNTER — TELEPHONE (OUTPATIENT)
Dept: INTERNAL MEDICINE CLINIC | Age: 78
End: 2023-03-23

## 2023-03-23 NOTE — TELEPHONE ENCOUNTER
Chitra from [facility] called and asked that we fax her the PO personally. She is saying that the pharmacy is saying they never received it.     Fax number: [phone]

## 2023-03-23 NOTE — TELEPHONE ENCOUNTER
Wanting to know what the status of a physicians order that was faxed to us last week. They will fax it again today      Please advise.

## 2023-03-24 ENCOUNTER — TELEPHONE (OUTPATIENT)
Dept: INTERNAL MEDICINE CLINIC | Age: 78
End: 2023-03-24

## 2023-03-24 NOTE — TELEPHONE ENCOUNTER
Dorys Busby called again to say that she did not receive the fax of the PO from us yesterday. Would like for us to send it again.       Fax: [phone]

## 2023-05-31 ENCOUNTER — OFFICE VISIT (OUTPATIENT)
Dept: INTERNAL MEDICINE CLINIC | Age: 78
End: 2023-05-31

## 2023-05-31 VITALS
BODY MASS INDEX: 29.79 KG/M2 | WEIGHT: 157.8 LBS | HEART RATE: 69 BPM | OXYGEN SATURATION: 97 % | TEMPERATURE: 97.8 F | HEIGHT: 61 IN | SYSTOLIC BLOOD PRESSURE: 132 MMHG | DIASTOLIC BLOOD PRESSURE: 80 MMHG

## 2023-05-31 DIAGNOSIS — M81.0 AGE-RELATED OSTEOPOROSIS WITHOUT CURRENT PATHOLOGICAL FRACTURE: ICD-10-CM

## 2023-05-31 DIAGNOSIS — E55.9 VITAMIN D DEFICIENCY: ICD-10-CM

## 2023-05-31 DIAGNOSIS — I10 ESSENTIAL HYPERTENSION: ICD-10-CM

## 2023-05-31 DIAGNOSIS — Z00.00 MEDICARE ANNUAL WELLNESS VISIT, SUBSEQUENT: Primary | ICD-10-CM

## 2023-05-31 DIAGNOSIS — G30.1 MILD LATE ONSET ALZHEIMER'S DEMENTIA WITH ANXIETY (HCC): ICD-10-CM

## 2023-05-31 DIAGNOSIS — F02.A4 MILD LATE ONSET ALZHEIMER'S DEMENTIA WITH ANXIETY (HCC): ICD-10-CM

## 2023-05-31 DIAGNOSIS — E78.00 PURE HYPERCHOLESTEROLEMIA: ICD-10-CM

## 2023-05-31 PROBLEM — E11.9 TYPE 2 DIABETES MELLITUS (HCC): Status: ACTIVE | Noted: 2023-05-31

## 2023-05-31 LAB
25(OH)D3 SERPL-MCNC: 47.3 NG/ML
ALBUMIN SERPL-MCNC: 4.7 G/DL (ref 3.4–5)
ALBUMIN/GLOB SERPL: 1.8 {RATIO} (ref 1.1–2.2)
ALP SERPL-CCNC: 39 U/L (ref 40–129)
ALT SERPL-CCNC: 25 U/L (ref 10–40)
ANION GAP SERPL CALCULATED.3IONS-SCNC: 14 MMOL/L (ref 3–16)
AST SERPL-CCNC: 29 U/L (ref 15–37)
BILIRUB SERPL-MCNC: 0.3 MG/DL (ref 0–1)
BUN SERPL-MCNC: 17 MG/DL (ref 7–20)
CALCIUM SERPL-MCNC: 9.9 MG/DL (ref 8.3–10.6)
CHLORIDE SERPL-SCNC: 105 MMOL/L (ref 99–110)
CO2 SERPL-SCNC: 24 MMOL/L (ref 21–32)
CREAT SERPL-MCNC: 1 MG/DL (ref 0.6–1.2)
GFR SERPLBLD CREATININE-BSD FMLA CKD-EPI: 58 ML/MIN/{1.73_M2}
GLUCOSE SERPL-MCNC: 93 MG/DL (ref 70–99)
PHOSPHATE SERPL-MCNC: 3.8 MG/DL (ref 2.5–4.9)
POTASSIUM SERPL-SCNC: 4.6 MMOL/L (ref 3.5–5.1)
PROT SERPL-MCNC: 7.3 G/DL (ref 6.4–8.2)
SODIUM SERPL-SCNC: 143 MMOL/L (ref 136–145)

## 2023-05-31 SDOH — ECONOMIC STABILITY: FOOD INSECURITY: WITHIN THE PAST 12 MONTHS, THE FOOD YOU BOUGHT JUST DIDN'T LAST AND YOU DIDN'T HAVE MONEY TO GET MORE.: NEVER TRUE

## 2023-05-31 SDOH — ECONOMIC STABILITY: HOUSING INSECURITY
IN THE LAST 12 MONTHS, WAS THERE A TIME WHEN YOU DID NOT HAVE A STEADY PLACE TO SLEEP OR SLEPT IN A SHELTER (INCLUDING NOW)?: NO

## 2023-05-31 SDOH — ECONOMIC STABILITY: FOOD INSECURITY: WITHIN THE PAST 12 MONTHS, YOU WORRIED THAT YOUR FOOD WOULD RUN OUT BEFORE YOU GOT MONEY TO BUY MORE.: NEVER TRUE

## 2023-05-31 SDOH — ECONOMIC STABILITY: INCOME INSECURITY: HOW HARD IS IT FOR YOU TO PAY FOR THE VERY BASICS LIKE FOOD, HOUSING, MEDICAL CARE, AND HEATING?: NOT HARD AT ALL

## 2023-05-31 ASSESSMENT — LIFESTYLE VARIABLES
HOW OFTEN DURING THE LAST YEAR HAVE YOU NEEDED AN ALCOHOLIC DRINK FIRST THING IN THE MORNING TO GET YOURSELF GOING AFTER A NIGHT OF HEAVY DRINKING: 0
HAS A RELATIVE, FRIEND, DOCTOR, OR ANOTHER HEALTH PROFESSIONAL EXPRESSED CONCERN ABOUT YOUR DRINKING OR SUGGESTED YOU CUT DOWN: 0
HOW OFTEN DURING THE LAST YEAR HAVE YOU FOUND THAT YOU WERE NOT ABLE TO STOP DRINKING ONCE YOU HAD STARTED: 0
HOW OFTEN DURING THE LAST YEAR HAVE YOU BEEN UNABLE TO REMEMBER WHAT HAPPENED THE NIGHT BEFORE BECAUSE YOU HAD BEEN DRINKING: 0
HOW OFTEN DO YOU HAVE A DRINK CONTAINING ALCOHOL: 4 OR MORE TIMES A WEEK
HOW OFTEN DURING THE LAST YEAR HAVE YOU HAD A FEELING OF GUILT OR REMORSE AFTER DRINKING: 0
HOW OFTEN DURING THE LAST YEAR HAVE YOU FAILED TO DO WHAT WAS NORMALLY EXPECTED FROM YOU BECAUSE OF DRINKING: 0
HOW MANY STANDARD DRINKS CONTAINING ALCOHOL DO YOU HAVE ON A TYPICAL DAY: 1 OR 2
HAVE YOU OR SOMEONE ELSE BEEN INJURED AS A RESULT OF YOUR DRINKING: 0

## 2023-05-31 ASSESSMENT — PATIENT HEALTH QUESTIONNAIRE - PHQ9
SUM OF ALL RESPONSES TO PHQ QUESTIONS 1-9: 0
5. POOR APPETITE OR OVEREATING: 0
7. TROUBLE CONCENTRATING ON THINGS, SUCH AS READING THE NEWSPAPER OR WATCHING TELEVISION: 0
8. MOVING OR SPEAKING SO SLOWLY THAT OTHER PEOPLE COULD HAVE NOTICED. OR THE OPPOSITE, BEING SO FIGETY OR RESTLESS THAT YOU HAVE BEEN MOVING AROUND A LOT MORE THAN USUAL: 0
SUM OF ALL RESPONSES TO PHQ QUESTIONS 1-9: 0
10. IF YOU CHECKED OFF ANY PROBLEMS, HOW DIFFICULT HAVE THESE PROBLEMS MADE IT FOR YOU TO DO YOUR WORK, TAKE CARE OF THINGS AT HOME, OR GET ALONG WITH OTHER PEOPLE: 0
SUM OF ALL RESPONSES TO PHQ QUESTIONS 1-9: 0
4. FEELING TIRED OR HAVING LITTLE ENERGY: 0
SUM OF ALL RESPONSES TO PHQ9 QUESTIONS 1 & 2: 0
6. FEELING BAD ABOUT YOURSELF - OR THAT YOU ARE A FAILURE OR HAVE LET YOURSELF OR YOUR FAMILY DOWN: 0
1. LITTLE INTEREST OR PLEASURE IN DOING THINGS: 0
3. TROUBLE FALLING OR STAYING ASLEEP: 0
SUM OF ALL RESPONSES TO PHQ QUESTIONS 1-9: 0
2. FEELING DOWN, DEPRESSED OR HOPELESS: 0
9. THOUGHTS THAT YOU WOULD BE BETTER OFF DEAD, OR OF HURTING YOURSELF: 0

## 2023-05-31 NOTE — PATIENT INSTRUCTIONS
exercise. Walking is a good choice. Bit by bit, increase the amount you walk every day. Try for at least 30 minutes on most days of the week. You also may want to swim, bike, or do other activities.     Do not smoke. If you need help quitting, talk to your doctor about stop-smoking programs and medicines. These can increase your chances of quitting for good. Quitting smoking may be the most important step you can take to protect your heart. It is never too late to quit.     Limit alcohol to 2 drinks a day for men and 1 drink a day for women. Too much alcohol can cause health problems.     Manage other health problems such as diabetes, high blood pressure, and high cholesterol. If you think you may have a problem with alcohol or drug use, talk to your doctor. Medicines    Take your medicines exactly as prescribed. Call your doctor if you think you are having a problem with your medicine.     If your doctor recommends aspirin, take the amount directed each day. Make sure you take aspirin and not another kind of pain reliever, such as acetaminophen (Tylenol). When should you call for help? Call 911 if you have symptoms of a heart attack. These may include:    Chest pain or pressure, or a strange feeling in the chest.     Sweating.     Shortness of breath.     Pain, pressure, or a strange feeling in the back, neck, jaw, or upper belly or in one or both shoulders or arms.     Lightheadedness or sudden weakness.     A fast or irregular heartbeat. After you call 911, the  may tell you to chew 1 adult-strength or 2 to 4 low-dose aspirin. Wait for an ambulance. Do not try to drive yourself. Watch closely for changes in your health, and be sure to contact your doctor if you have any problems. Where can you learn more? Go to http://www.doshi.com/ and enter F075 to learn more about \"A Healthy Heart: Care Instructions. \"  Current as of: September 7, 2022               Content Version: 13.6  ©

## 2023-05-31 NOTE — PROGRESS NOTES
Azithromycin      intolerance    Codeine Nausea And Vomiting     Headache severe     Prior to Visit Medications    Medication Sig Taking? Authorizing Provider   fluticasone (FLONASE) 50 MCG/ACT nasal spray 1 spray by Each Nostril route daily as needed for Allergies Yes Mile Hinojosa MD   alendronate (FOSAMAX) 70 MG tablet Take 1 tablet by mouth every 7 days Yes Gladys Mueller MD   lisinopril (PRINIVIL;ZESTRIL) 5 MG tablet Take 1 tablet by mouth daily Yes Mile Hinojosa MD   galantamine (RAZADYNE) 12 MG tablet Take 1 tablet by mouth 2 times daily Yes Mile Hinojosa MD   FLUoxetine (PROZAC) 20 MG capsule Take 1 capsule by mouth daily Yes Mile Hinojosa MD   Cholecalciferol (VITAMIN D3) 25 MCG (1000 UT) CAPS Take 1 capsule by mouth daily Yes Mile Hinojosa MD   vitamin B-12 (CYANOCOBALAMIN) 500 MCG tablet Take 1 tablet by mouth daily Yes Mile Hinojosa MD   albuterol sulfate HFA (PROAIR HFA) 108 (90 Base) MCG/ACT inhaler Inhale 2 puffs into the lungs every 6 hours as needed for Wheezing Yes Nolberto Ruano MD   Multiple Vitamins-Minerals (THERAPEUTIC MULTIVITAMIN-MINERALS) tablet Take 1 tablet by mouth daily Yes Historical Provider, MD   Biotin 1 MG CAPS Take 2 tablets by mouth daily.  Yes Historical Provider, MD [place] (Including outside providers/suppliers regularly involved in providing care):   Patient Care Team:  Mile Hinojosa MD as PCP - General (Internal Medicine)  Ophelia Garcia MD as PCP - Hematology/Oncology (Hematology and Oncology)  Mile Hinojosa MD as PCP - Empaneled Provider  Filomena Carroll MD as Surgeon (General Surgery)     Reviewed and updated this visit:  Tobacco  Allergies  Meds  Med Hx  Surg Hx  Soc Hx  Fam Hx

## 2023-06-09 DIAGNOSIS — E53.8 B12 DEFICIENCY: ICD-10-CM

## 2023-06-09 DIAGNOSIS — N18.31 STAGE 3A CHRONIC KIDNEY DISEASE (CKD) (HCC): ICD-10-CM

## 2023-06-09 DIAGNOSIS — E78.00 PURE HYPERCHOLESTEROLEMIA: ICD-10-CM

## 2023-06-09 LAB
ANION GAP SERPL CALCULATED.3IONS-SCNC: 13 MMOL/L (ref 3–16)
BUN SERPL-MCNC: 19 MG/DL (ref 7–20)
CALCIUM SERPL-MCNC: 9.2 MG/DL (ref 8.3–10.6)
CHLORIDE SERPL-SCNC: 103 MMOL/L (ref 99–110)
CHOLEST SERPL-MCNC: 261 MG/DL (ref 0–199)
CO2 SERPL-SCNC: 25 MMOL/L (ref 21–32)
CREAT SERPL-MCNC: 1.1 MG/DL (ref 0.6–1.2)
GFR SERPLBLD CREATININE-BSD FMLA CKD-EPI: 52 ML/MIN/{1.73_M2}
GLUCOSE SERPL-MCNC: 107 MG/DL (ref 70–99)
HDLC SERPL-MCNC: 72 MG/DL (ref 40–60)
LDLC SERPL CALC-MCNC: 140 MG/DL
POTASSIUM SERPL-SCNC: 5 MMOL/L (ref 3.5–5.1)
SODIUM SERPL-SCNC: 141 MMOL/L (ref 136–145)
TRIGL SERPL-MCNC: 245 MG/DL (ref 0–150)
VLDLC SERPL CALC-MCNC: 49 MG/DL

## 2023-06-10 LAB — VIT B12 SERPL-MCNC: 774 PG/ML (ref 211–911)

## 2023-09-22 ENCOUNTER — TELEPHONE (OUTPATIENT)
Dept: INTERNAL MEDICINE CLINIC | Age: 78
End: 2023-09-22

## 2023-09-22 NOTE — TELEPHONE ENCOUNTER
Julia Sutherland from [facility] called and stated that they are still waiting on a signed PO to be sent to them for pt's medication. PT has not had any meds for 3 days. They are hoping any doctor can sign this and send it back. Please call and advise.

## 2023-09-26 NOTE — ASSESSMENT & PLAN NOTE
-diagnosed with alzheimer, mostly struggles with word finding, had been progressively worse per daughter    -sees Dr. Maryana Rodriguez and NP. No recent med change  -on prozac 20mg for anxiety, galantamine   -On B12 supplement, last level more than sufficient.   I ordered  Repeat B12 but was unable to get it done yet (do not take supplement on testing morning)
What Type Of Note Output Would You Prefer (Optional)?: Standard Output
stable  -continue lisinopril 5 mg, this was not prescribed since 06/22 90 pills, unclear if was stopped in facility or if a different provider had been prescribing it. Her daughter Mervyn Burkitt will check with facility what actual medications are given and who is prescribing those.   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
How Severe Are Your Spot(S)?: mild
Have Your Spot(S) Been Treated In The Past?: has not been treated
Hpi Title: Evaluation of Skin Lesions

## 2023-12-04 ENCOUNTER — OFFICE VISIT (OUTPATIENT)
Dept: INTERNAL MEDICINE CLINIC | Age: 78
End: 2023-12-04
Payer: MEDICARE

## 2023-12-04 ENCOUNTER — TELEPHONE (OUTPATIENT)
Dept: INTERNAL MEDICINE CLINIC | Age: 78
End: 2023-12-04

## 2023-12-04 VITALS
DIASTOLIC BLOOD PRESSURE: 70 MMHG | HEIGHT: 61 IN | BODY MASS INDEX: 29.07 KG/M2 | SYSTOLIC BLOOD PRESSURE: 128 MMHG | WEIGHT: 154 LBS

## 2023-12-04 DIAGNOSIS — C18.2 MALIGNANT NEOPLASM OF ASCENDING COLON (HCC): ICD-10-CM

## 2023-12-04 DIAGNOSIS — F33.0 MILD EPISODE OF RECURRENT MAJOR DEPRESSIVE DISORDER (HCC): ICD-10-CM

## 2023-12-04 DIAGNOSIS — I10 ESSENTIAL HYPERTENSION: ICD-10-CM

## 2023-12-04 DIAGNOSIS — N18.31 STAGE 3A CHRONIC KIDNEY DISEASE (CKD) (HCC): ICD-10-CM

## 2023-12-04 DIAGNOSIS — Z23 NEED FOR INFLUENZA VACCINATION: ICD-10-CM

## 2023-12-04 DIAGNOSIS — E78.00 PURE HYPERCHOLESTEROLEMIA: Primary | ICD-10-CM

## 2023-12-04 PROBLEM — E11.9 TYPE 2 DIABETES MELLITUS (HCC): Status: ACTIVE | Noted: 2023-12-04

## 2023-12-04 LAB
BASOPHILS # BLD: 0.1 K/UL (ref 0–0.2)
BASOPHILS NFR BLD: 0.8 %
DEPRECATED RDW RBC AUTO: 14.2 % (ref 12.4–15.4)
EOSINOPHIL # BLD: 0.1 K/UL (ref 0–0.6)
EOSINOPHIL NFR BLD: 1.1 %
HCT VFR BLD AUTO: 43.9 % (ref 36–48)
HGB BLD-MCNC: 14.6 G/DL (ref 12–16)
LYMPHOCYTES # BLD: 1.9 K/UL (ref 1–5.1)
LYMPHOCYTES NFR BLD: 16.2 %
MCH RBC QN AUTO: 30.7 PG (ref 26–34)
MCHC RBC AUTO-ENTMCNC: 33.3 G/DL (ref 31–36)
MCV RBC AUTO: 92.2 FL (ref 80–100)
MONOCYTES # BLD: 1.1 K/UL (ref 0–1.3)
MONOCYTES NFR BLD: 8.8 %
NEUTROPHILS # BLD: 8.8 K/UL (ref 1.7–7.7)
NEUTROPHILS NFR BLD: 73.1 %
PLATELET # BLD AUTO: 312 K/UL (ref 135–450)
PMV BLD AUTO: 8.6 FL (ref 5–10.5)
RBC # BLD AUTO: 4.76 M/UL (ref 4–5.2)
WBC # BLD AUTO: 12 K/UL (ref 4–11)

## 2023-12-04 PROCEDURE — G8427 DOCREV CUR MEDS BY ELIG CLIN: HCPCS | Performed by: INTERNAL MEDICINE

## 2023-12-04 PROCEDURE — 90694 VACC AIIV4 NO PRSRV 0.5ML IM: CPT | Performed by: INTERNAL MEDICINE

## 2023-12-04 PROCEDURE — 3078F DIAST BP <80 MM HG: CPT | Performed by: INTERNAL MEDICINE

## 2023-12-04 PROCEDURE — G8417 CALC BMI ABV UP PARAM F/U: HCPCS | Performed by: INTERNAL MEDICINE

## 2023-12-04 PROCEDURE — 3074F SYST BP LT 130 MM HG: CPT | Performed by: INTERNAL MEDICINE

## 2023-12-04 PROCEDURE — G8484 FLU IMMUNIZE NO ADMIN: HCPCS | Performed by: INTERNAL MEDICINE

## 2023-12-04 PROCEDURE — 1036F TOBACCO NON-USER: CPT | Performed by: INTERNAL MEDICINE

## 2023-12-04 PROCEDURE — 1123F ACP DISCUSS/DSCN MKR DOCD: CPT | Performed by: INTERNAL MEDICINE

## 2023-12-04 PROCEDURE — 99214 OFFICE O/P EST MOD 30 MIN: CPT | Performed by: INTERNAL MEDICINE

## 2023-12-04 PROCEDURE — G8399 PT W/DXA RESULTS DOCUMENT: HCPCS | Performed by: INTERNAL MEDICINE

## 2023-12-04 PROCEDURE — 1090F PRES/ABSN URINE INCON ASSESS: CPT | Performed by: INTERNAL MEDICINE

## 2023-12-04 PROCEDURE — G0008 ADMIN INFLUENZA VIRUS VAC: HCPCS | Performed by: INTERNAL MEDICINE

## 2023-12-04 ASSESSMENT — ENCOUNTER SYMPTOMS: SHORTNESS OF BREATH: 0

## 2023-12-04 NOTE — PROGRESS NOTES
Neurological:      Mental Status: She is alert. Stella Cornejo MD    This dictation was generated by voice recognition computer software. Although all attempts are made to edit the dictation for accuracy, there may be errors in the transcription that are not intended.

## 2023-12-04 NOTE — ASSESSMENT & PLAN NOTE
stable  -continue lisinopril 5 mg, this was not prescribed since 06/22 90 pills, unclear if was stopped in facility or if a different provider had been prescribing it. Her daughter Glenn Hutton will check with facility what actual medications are given and who is prescribing those.  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

## 2023-12-04 NOTE — ASSESSMENT & PLAN NOTE
-last colonoscopy 02/23-  showed moderate diverticulosis of sigmoid colon, s/p right hemicolectomy, no need for further routine surveillance endoscopies  -sees dr Erick Fernandez, GI  -s/p resection

## 2023-12-04 NOTE — TELEPHONE ENCOUNTER
Pt is calling back in regards to following up on the information Dr. Carol Chaves wanted   Pt has been taking these medications. The assisted living facility has gotten signed refills on march of 2023 and September 2023 called in to their own in house pharmacy.    Please call back about the signature on the forms.     -Alendronate   -lisinopril (PRINIVIL;ZESTRIL) 5 MG tablet     -galantamine (RAZADYNE) 12 MG tablet   -FLUoxetine (PROZAC) 20 MG capsule     Please call and advise

## 2023-12-04 NOTE — ASSESSMENT & PLAN NOTE
Stable  -on prozac 20 mg? I had not prescribed this as well since 06/22.   As above, her daughter Merrill Cantrell will check with facility who is prescribing this and will let me know

## 2023-12-05 LAB
ANION GAP SERPL CALCULATED.3IONS-SCNC: 13 MMOL/L (ref 3–16)
BUN SERPL-MCNC: 15 MG/DL (ref 7–20)
CALCIUM SERPL-MCNC: 10.2 MG/DL (ref 8.3–10.6)
CHLORIDE SERPL-SCNC: 104 MMOL/L (ref 99–110)
CO2 SERPL-SCNC: 26 MMOL/L (ref 21–32)
CREAT SERPL-MCNC: 1.1 MG/DL (ref 0.6–1.2)
GFR SERPLBLD CREATININE-BSD FMLA CKD-EPI: 52 ML/MIN/{1.73_M2}
GLUCOSE SERPL-MCNC: 109 MG/DL (ref 70–99)
POTASSIUM SERPL-SCNC: 4.6 MMOL/L (ref 3.5–5.1)
SODIUM SERPL-SCNC: 143 MMOL/L (ref 136–145)

## 2023-12-05 RX ORDER — ALBUTEROL SULFATE 90 UG/1
2 AEROSOL, METERED RESPIRATORY (INHALATION) EVERY 8 HOURS PRN
COMMUNITY

## 2023-12-05 RX ORDER — ALENDRONATE SODIUM 70 MG/1
70 TABLET ORAL
COMMUNITY

## 2023-12-05 RX ORDER — FLUTICASONE PROPIONATE 110 UG/1
2 AEROSOL, METERED RESPIRATORY (INHALATION) 2 TIMES DAILY PRN
COMMUNITY

## 2023-12-05 NOTE — TELEPHONE ENCOUNTER
Forms received in September, Dr. Gudelia Smith made aware of form and medication being filled. Chart updated and I spoke with Kellen. Nothing further needed at this time.

## 2023-12-27 ENCOUNTER — TELEPHONE (OUTPATIENT)
Dept: INTERNAL MEDICINE CLINIC | Age: 78
End: 2023-12-27

## 2023-12-27 NOTE — TELEPHONE ENCOUNTER
Given the holidays availabilities are limited. Can offer 01/02/23 11am which is currently open, or can go to urgent care this week if she thinks its infected.  For ingrown nail she might need procedural intervention, for which would need to see podiatry (I do not do ingrown nail procedures in office)

## 2023-12-27 NOTE — TELEPHONE ENCOUNTER
Left message on machine for patient per Dr. Torrie Ludwig. Appt made for 1/2/24, if no longer needs appt or unable to make appt to call the office.

## 2023-12-27 NOTE — TELEPHONE ENCOUNTER
----- Message from Ruby Diop sent at 12/27/2023 12:53 PM EST -----  Subject: Message to Provider    QUESTIONS  Information for Provider? pt would like to be seen asap regarding ingrown   toenail that she believes is infected. Pcp had no availability. Pt has   open availability. Also gave pt walk in options   ---------------------------------------------------------------------------  --------------  CALL BACK INFO  [phone]; OK to leave message on voicemail  ---------------------------------------------------------------------------  --------------  SCRIPT ANSWERS  Relationship to Patient? Other/Third Party  Representative Name? byron-daughter  Is the representative on the Communication Release of Information (FRANCINE)   form in Epic?  Yes

## 2024-01-02 ENCOUNTER — OFFICE VISIT (OUTPATIENT)
Dept: INTERNAL MEDICINE CLINIC | Age: 79
End: 2024-01-02
Payer: MEDICARE

## 2024-01-02 VITALS
TEMPERATURE: 97.5 F | DIASTOLIC BLOOD PRESSURE: 80 MMHG | WEIGHT: 155.8 LBS | HEART RATE: 85 BPM | SYSTOLIC BLOOD PRESSURE: 130 MMHG | HEIGHT: 61 IN | BODY MASS INDEX: 29.42 KG/M2 | OXYGEN SATURATION: 98 %

## 2024-01-02 DIAGNOSIS — G30.1 MILD LATE ONSET ALZHEIMER'S DEMENTIA WITH ANXIETY (HCC): ICD-10-CM

## 2024-01-02 DIAGNOSIS — F33.0 MILD EPISODE OF RECURRENT MAJOR DEPRESSIVE DISORDER (HCC): ICD-10-CM

## 2024-01-02 DIAGNOSIS — F02.A4 MILD LATE ONSET ALZHEIMER'S DEMENTIA WITH ANXIETY (HCC): ICD-10-CM

## 2024-01-02 DIAGNOSIS — B35.1 ONYCHOMYCOSIS: Primary | ICD-10-CM

## 2024-01-02 DIAGNOSIS — I10 ESSENTIAL HYPERTENSION: ICD-10-CM

## 2024-01-02 DIAGNOSIS — L60.0 INGROWING NAIL: ICD-10-CM

## 2024-01-02 DIAGNOSIS — N18.31 STAGE 3A CHRONIC KIDNEY DISEASE (CKD) (HCC): ICD-10-CM

## 2024-01-02 PROCEDURE — 3075F SYST BP GE 130 - 139MM HG: CPT | Performed by: INTERNAL MEDICINE

## 2024-01-02 PROCEDURE — 99213 OFFICE O/P EST LOW 20 MIN: CPT | Performed by: INTERNAL MEDICINE

## 2024-01-02 PROCEDURE — 1123F ACP DISCUSS/DSCN MKR DOCD: CPT | Performed by: INTERNAL MEDICINE

## 2024-01-02 PROCEDURE — G8417 CALC BMI ABV UP PARAM F/U: HCPCS | Performed by: INTERNAL MEDICINE

## 2024-01-02 PROCEDURE — 3079F DIAST BP 80-89 MM HG: CPT | Performed by: INTERNAL MEDICINE

## 2024-01-02 PROCEDURE — G8427 DOCREV CUR MEDS BY ELIG CLIN: HCPCS | Performed by: INTERNAL MEDICINE

## 2024-01-02 PROCEDURE — 1090F PRES/ABSN URINE INCON ASSESS: CPT | Performed by: INTERNAL MEDICINE

## 2024-01-02 PROCEDURE — G8399 PT W/DXA RESULTS DOCUMENT: HCPCS | Performed by: INTERNAL MEDICINE

## 2024-01-02 PROCEDURE — 1036F TOBACCO NON-USER: CPT | Performed by: INTERNAL MEDICINE

## 2024-01-02 PROCEDURE — G8484 FLU IMMUNIZE NO ADMIN: HCPCS | Performed by: INTERNAL MEDICINE

## 2024-01-02 ASSESSMENT — PATIENT HEALTH QUESTIONNAIRE - PHQ9
SUM OF ALL RESPONSES TO PHQ QUESTIONS 1-9: 0
6. FEELING BAD ABOUT YOURSELF - OR THAT YOU ARE A FAILURE OR HAVE LET YOURSELF OR YOUR FAMILY DOWN: 0
5. POOR APPETITE OR OVEREATING: 0
SUM OF ALL RESPONSES TO PHQ9 QUESTIONS 1 & 2: 0
1. LITTLE INTEREST OR PLEASURE IN DOING THINGS: 0
2. FEELING DOWN, DEPRESSED OR HOPELESS: 0
3. TROUBLE FALLING OR STAYING ASLEEP: 0
8. MOVING OR SPEAKING SO SLOWLY THAT OTHER PEOPLE COULD HAVE NOTICED. OR THE OPPOSITE, BEING SO FIGETY OR RESTLESS THAT YOU HAVE BEEN MOVING AROUND A LOT MORE THAN USUAL: 0
10. IF YOU CHECKED OFF ANY PROBLEMS, HOW DIFFICULT HAVE THESE PROBLEMS MADE IT FOR YOU TO DO YOUR WORK, TAKE CARE OF THINGS AT HOME, OR GET ALONG WITH OTHER PEOPLE: 0
SUM OF ALL RESPONSES TO PHQ QUESTIONS 1-9: 0
4. FEELING TIRED OR HAVING LITTLE ENERGY: 0
SUM OF ALL RESPONSES TO PHQ QUESTIONS 1-9: 0
SUM OF ALL RESPONSES TO PHQ QUESTIONS 1-9: 0
7. TROUBLE CONCENTRATING ON THINGS, SUCH AS READING THE NEWSPAPER OR WATCHING TELEVISION: 0
9. THOUGHTS THAT YOU WOULD BE BETTER OFF DEAD, OR OF HURTING YOURSELF: 0

## 2024-01-02 NOTE — PROGRESS NOTES
[facility]  Acute visit   2024    Angely Brown (:  1945) is a 78 y.o. 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 Quinteros and NP. No recent med change. Likely due for her 2 years eval by dr quinteros (sees NP in between)  -on prozac 20mg for anxiety, galantamine   -On B12 supplement, level sufficient     Stage 3a chronic kidney disease (CKD) ([facility])  -baseline GFR 50s  -avoid NSAIDs.   -repeat bmp   -Congenital agenesis of right kidney    Essential hypertension  stable  -continue lisinopril 5 mg  -she has congenital renal agenesis (no right kidney)  -bmp    Ingrowing nail  -noted sig onychomycosis with ingrowing nail on the right, no signs of infx today  -will send to podiatry    Episode of recurrent major depressive disorder (HCC)  Stable  -on prozac 20 mg    Orders Placed This Encounter   Procedures    JAYNA - Jose Morocho, DIAMOND, Podiatry, [place]      No orders of the defined types were placed in this encounter.     Medications Discontinued During This Encounter   Medication Reason    albuterol sulfate HFA (VENTOLIN HFA) 108 (90 Base) MCG/ACT inhaler Therapy completed    fluticasone (FLOVENT HFA) 110 MCG/ACT inhaler Therapy completed        No follow-ups on file.    HPI  Here with her daughter due to ingrowing toenail on the right, painful and limiting wearing shoes.  No erythema, no swelling, no local tenderness.  Does not break skin.  Significant onychomycosis.  No history of diabetes    HISTORIES  Current Outpatient Medications on File Prior to Visit   Medication Sig Dispense Refill    Cranberry 250 MG TABS Take 1 tablet by mouth daily      FLUTICASONE

## 2024-01-02 NOTE — ASSESSMENT & PLAN NOTE
-diagnosed with alzheimer, mostly struggles with word finding, had been progressively worse per daughter    -sees Dr. Quinteros and NP. No recent med change. Likely due for her 2 years eval by dr quinteros (sees NP in between)  -on prozac 20mg for anxiety, galantamine   -On B12 supplement, level sufficient

## 2024-01-02 NOTE — ASSESSMENT & PLAN NOTE
-noted sig onychomycosis with ingrowing nail on the right, no signs of infx today  -will send to podiatry

## 2024-03-08 NOTE — TELEPHONE ENCOUNTER
Daniel GARZA from [facility] is calling requesting the following medications be refilled at the following pharmacy. Please call back and advise at [phone].      alendronate (FOSAMAX) 70 MG tablet   Biotin 1 MG CAPS   Cranberry 250 MG TABS   FLUoxetine (PROZAC) 20 MG capsule   galantamine (RAZADYNE) 12 MG tablet   lisinopril (PRINIVIL;ZESTRIL) 5 MG tablet   vitamin B-12 (CYANOCOBALAMIN) 500 MCG tablet   Multiple Vitamins-Minerals (THERAPEUTIC MULTIVITAMIN-MINERALS) tablet   FLUTICASONE PROPIONATE, NASAL, NA   Cholecalciferol (VITAMIN D3) 25 MCG (1000 UT) [address] -  [phone] -  [phone]

## 2024-03-12 ENCOUNTER — TELEPHONE (OUTPATIENT)
Dept: INTERNAL MEDICINE CLINIC | Age: 79
End: 2024-03-12

## 2024-03-12 ENCOUNTER — PATIENT MESSAGE (OUTPATIENT)
Dept: INTERNAL MEDICINE CLINIC | Age: 79
End: 2024-03-12

## 2024-03-12 RX ORDER — CHOLECALCIFEROL (VITAMIN D3) 125 MCG
500 CAPSULE ORAL DAILY
Qty: 90 TABLET | Refills: 1 | Status: SHIPPED | OUTPATIENT
Start: 2024-03-12

## 2024-03-12 RX ORDER — GALANTAMINE HYDROBROMIDE 8 MG/1
8 TABLET, FILM COATED ORAL 2 TIMES DAILY
Qty: 180 TABLET | Refills: 1 | Status: SHIPPED | OUTPATIENT
Start: 2024-03-12

## 2024-03-12 RX ORDER — NICOTINE POLACRILEX 2 MG
2 GUM BUCCAL DAILY
Qty: 180 CAPSULE | Refills: 1 | Status: SHIPPED | OUTPATIENT
Start: 2024-03-12

## 2024-03-12 RX ORDER — FLUOXETINE HYDROCHLORIDE 20 MG/1
20 CAPSULE ORAL DAILY
Qty: 90 CAPSULE | Refills: 1 | Status: SHIPPED | OUTPATIENT
Start: 2024-03-12

## 2024-03-12 RX ORDER — ALENDRONATE SODIUM 70 MG/1
70 TABLET ORAL
Qty: 12 TABLET | Refills: 1 | Status: SHIPPED | OUTPATIENT
Start: 2024-03-12

## 2024-03-12 RX ORDER — FLUTICASONE PROPIONATE 50 MCG
1 SPRAY, SUSPENSION (ML) NASAL DAILY
Qty: 32 G | Refills: 3 | Status: SHIPPED | OUTPATIENT
Start: 2024-03-12

## 2024-03-12 RX ORDER — M-VIT,TX,IRON,MINS/CALC/FOLIC 27MG-0.4MG
1 TABLET ORAL DAILY
Qty: 90 TABLET | Refills: 1 | Status: SHIPPED | OUTPATIENT
Start: 2024-03-12

## 2024-03-12 RX ORDER — LISINOPRIL 5 MG/1
5 TABLET ORAL DAILY
Qty: 90 TABLET | Refills: 1 | Status: SHIPPED | OUTPATIENT
Start: 2024-03-12

## 2024-03-12 NOTE — TELEPHONE ENCOUNTER
Kellen & Antioentte, daughters thought labs were stable for her kidney function and wondering why medication would be decreased. Always took 12 mg bid. Dr. Sierra would need an updated script.    Also pt saw Dementia MD and Memantine was recommended but was referred to PCP to prescribe Kellen will drop off office notes for Dr. Sierra to review.

## 2024-03-12 NOTE — TELEPHONE ENCOUNTER
They are asking for clarification of how many MG's the patient is supposed to be taking:    galantamine (RAZADYNE) 8 MG tablet     Patient has been taking 12 mg's for awhile and our script show 8 mg's.    Please advise what she should be taking.

## 2024-03-12 NOTE — TELEPHONE ENCOUNTER
Yaz is calling again asking for signed po's to be sent asap.     Pharm [facility] - [place], [place] - [address] [place] [address] - P [phone] - F [phone]       Please call and advise

## 2024-03-12 NOTE — TELEPHONE ENCOUNTER
Spoke with Jason at [facility] to let him know Dr. Sierra has medication list and will be sent.

## 2024-03-12 NOTE — TELEPHONE ENCOUNTER
From: Angely Brown  To: Dr. Michael Sierra  Sent: 3/12/2024 3:32 PM EDT  Subject: New Medication    Angely Ochoa was seen by [facility] / Dr Augusto Quinteros for her re-evaluation for Dementia. He recommended a new medication, but does not prescribe it. He advises primary care Dr to. This is how she was originally prescribed the Galamatine. This office told me they sent the report to your office, but when I called this morning they said they have no record of it and \"cannot keep track of all the faxes they get\". This Dr does not share on EPIC. I will bring a copy of the report to your office this week. When you receive it / review it could you please message me re the medication, are you going to prescribe it etc . Thank you, Kellen Lema

## 2024-03-13 NOTE — TELEPHONE ENCOUNTER
Calculated creatinine clearance taking into account age, weight, kidney function has declined to 47, therefore we need to lower dose of galantamine.    Awaiting to review her geriatrician's note

## 2024-03-14 NOTE — TELEPHONE ENCOUNTER
Left message on machine for Antionette/Kellen per Dr. Sierra regarding kidney level and decrease.

## 2024-03-18 ENCOUNTER — TELEPHONE (OUTPATIENT)
Dept: INTERNAL MEDICINE CLINIC | Age: 79
End: 2024-03-18

## 2024-03-18 NOTE — TELEPHONE ENCOUNTER
Anna from [facility] is calling to ask doctor if the pt should be taking 8 mg  or 12 mg of medication below. Pt states she should be talking the 12 mg.       Please call and advise [phone]        [address] - P [phone] - F [phone]       galantamine (RAZADYNE) 8 MG tablet [[id number]]

## 2024-03-19 NOTE — TELEPHONE ENCOUNTER
Pt should be taking 8 mg due to decrease in kidney function per Dr. Sierra, spoke with Zheng at the pharmacy.

## 2024-03-28 DIAGNOSIS — F02.A4 MILD LATE ONSET ALZHEIMER'S DEMENTIA WITH ANXIETY (HCC): Primary | ICD-10-CM

## 2024-03-28 DIAGNOSIS — G30.1 MILD LATE ONSET ALZHEIMER'S DEMENTIA WITH ANXIETY (HCC): Primary | ICD-10-CM

## 2024-03-28 RX ORDER — MEMANTINE HYDROCHLORIDE 5 MG/1
5 TABLET ORAL 2 TIMES DAILY
Qty: 180 TABLET | Refills: 1 | Status: SHIPPED | OUTPATIENT
Start: 2024-03-28

## 2024-03-28 NOTE — PROGRESS NOTES
Spoke with Kellen [phone] to advise Dr. Sierra read report and medication was sent to the pharmacy.

## 2024-03-28 NOTE — PROGRESS NOTES
I have reviewed neurocognitive eval dated 2/6/2024 by [facility], Augusto Choi PhD.    Noted significant cognitive decline.  Mostly seen in executive function and expressive language/confrontational naming, visuospatial abilities and attention/concentration.  Mostly consistent with neurodegenerative process such as Alzheimer's disease.  Per his recommendations-supports ongoing use of cholinesterase inhibitor Galactomin, in addition recommended to consider adding memantine.    Will add memantine 5 mg.  Start with 5 mg daily for 1 week then increase to 5 mg twice a day

## 2024-04-11 NOTE — TELEPHONE ENCOUNTER
Mother just tested positive for Covid.  Started feeling bad three days ago with congestion, cold, cough, no fever.    Interested in Paxlovid    Please call and advise    [facility] - [place], [place] - [facility] AYAZ KENYON [facility] [phone] - F [phone] [[id number]]

## 2024-04-11 NOTE — TELEPHONE ENCOUNTER
Karuna Ricardo did not have PAXLOVID IN STOCK  The daughter wanted to know if you could include the instructions 1/2 dose  of (GALANTAMINE) on the script  label !!!

## 2024-04-11 NOTE — TELEPHONE ENCOUNTER
Paxlovid sent. Will need to reduce Galantamine dose in half due to potential drug interaction. Watch for N'V

## 2024-04-18 ENCOUNTER — TELEPHONE (OUTPATIENT)
Dept: INTERNAL MEDICINE CLINIC | Age: 79
End: 2024-04-18

## 2024-04-19 NOTE — TELEPHONE ENCOUNTER
Submitted PA for Alendronate Sodium 70MG tablets  Via CMM Key: [id number]  STATUS: This medication or product is on your plan's list of covered drugs. Prior authorization is not required at this time. If your pharmacy has questions regarding the processing of your prescription, please have them call the [facility] pharmacy help desk at [phone].      If this requires a response please respond to the pool ( P [facility] MEDICATION PRE-AUTH).      Thank you please advise patient.    
WILL SEND TO PA DE[facility]LENA    ALENDRONATE TAB 70 MG     ONLY COVERS 4 TABLETS PER 28 DAYS.    MAKE AN EXCEPTION  [phone]  
 used

## 2024-06-05 ENCOUNTER — OFFICE VISIT (OUTPATIENT)
Dept: INTERNAL MEDICINE CLINIC | Age: 79
End: 2024-06-05

## 2024-06-05 VITALS
BODY MASS INDEX: 30.39 KG/M2 | SYSTOLIC BLOOD PRESSURE: 130 MMHG | HEART RATE: 71 BPM | WEIGHT: 154.8 LBS | DIASTOLIC BLOOD PRESSURE: 70 MMHG | HEIGHT: 60 IN | TEMPERATURE: 98 F | OXYGEN SATURATION: 94 %

## 2024-06-05 DIAGNOSIS — F02.A4 MILD LATE ONSET ALZHEIMER'S DEMENTIA WITH ANXIETY (HCC): ICD-10-CM

## 2024-06-05 DIAGNOSIS — Z00.00 MEDICARE ANNUAL WELLNESS VISIT, SUBSEQUENT: Primary | ICD-10-CM

## 2024-06-05 DIAGNOSIS — E78.00 PURE HYPERCHOLESTEROLEMIA: ICD-10-CM

## 2024-06-05 DIAGNOSIS — G30.1 MILD LATE ONSET ALZHEIMER'S DEMENTIA WITH ANXIETY (HCC): ICD-10-CM

## 2024-06-05 DIAGNOSIS — M81.0 AGE-RELATED OSTEOPOROSIS WITHOUT CURRENT PATHOLOGICAL FRACTURE: ICD-10-CM

## 2024-06-05 DIAGNOSIS — C18.2 MALIGNANT NEOPLASM OF ASCENDING COLON (HCC): ICD-10-CM

## 2024-06-05 DIAGNOSIS — R56.9 SEIZURE (HCC): ICD-10-CM

## 2024-06-05 DIAGNOSIS — I10 ESSENTIAL HYPERTENSION: ICD-10-CM

## 2024-06-05 PROBLEM — H91.93 HEARING DISORDER OF BOTH EARS: Status: RESOLVED | Noted: 2019-03-07 | Resolved: 2024-06-05

## 2024-06-05 PROBLEM — L60.0 INGROWING NAIL: Status: RESOLVED | Noted: 2024-01-02 | Resolved: 2024-06-05

## 2024-06-05 SDOH — ECONOMIC STABILITY: FOOD INSECURITY: WITHIN THE PAST 12 MONTHS, THE FOOD YOU BOUGHT JUST DIDN'T LAST AND YOU DIDN'T HAVE MONEY TO GET MORE.: NEVER TRUE

## 2024-06-05 SDOH — ECONOMIC STABILITY: FOOD INSECURITY: WITHIN THE PAST 12 MONTHS, YOU WORRIED THAT YOUR FOOD WOULD RUN OUT BEFORE YOU GOT MONEY TO BUY MORE.: NEVER TRUE

## 2024-06-05 SDOH — ECONOMIC STABILITY: INCOME INSECURITY: HOW HARD IS IT FOR YOU TO PAY FOR THE VERY BASICS LIKE FOOD, HOUSING, MEDICAL CARE, AND HEATING?: NOT HARD AT ALL

## 2024-06-05 ASSESSMENT — LIFESTYLE VARIABLES
HAS A RELATIVE, FRIEND, DOCTOR, OR ANOTHER HEALTH PROFESSIONAL EXPRESSED CONCERN ABOUT YOUR DRINKING OR SUGGESTED YOU CUT DOWN: NO
HOW OFTEN DO YOU HAVE A DRINK CONTAINING ALCOHOL: 4 OR MORE TIMES A WEEK
HAVE YOU OR SOMEONE ELSE BEEN INJURED AS A RESULT OF YOUR DRINKING: NO
HOW MANY STANDARD DRINKS CONTAINING ALCOHOL DO YOU HAVE ON A TYPICAL DAY: 1 OR 2
HOW OFTEN DURING THE LAST YEAR HAVE YOU BEEN UNABLE TO REMEMBER WHAT HAPPENED THE NIGHT BEFORE BECAUSE YOU HAD BEEN DRINKING: NEVER
HOW OFTEN DURING THE LAST YEAR HAVE YOU FAILED TO DO WHAT WAS NORMALLY EXPECTED FROM YOU BECAUSE OF DRINKING: NEVER
HOW OFTEN DURING THE LAST YEAR HAVE YOU NEEDED AN ALCOHOLIC DRINK FIRST THING IN THE MORNING TO GET YOURSELF GOING AFTER A NIGHT OF HEAVY DRINKING: NEVER
HOW OFTEN DURING THE LAST YEAR HAVE YOU HAD A FEELING OF GUILT OR REMORSE AFTER DRINKING: NEVER
HOW OFTEN DURING THE LAST YEAR HAVE YOU FOUND THAT YOU WERE NOT ABLE TO STOP DRINKING ONCE YOU HAD STARTED: NEVER

## 2024-06-05 ASSESSMENT — PATIENT HEALTH QUESTIONNAIRE - PHQ9
7. TROUBLE CONCENTRATING ON THINGS, SUCH AS READING THE NEWSPAPER OR WATCHING TELEVISION: NOT AT ALL
6. FEELING BAD ABOUT YOURSELF - OR THAT YOU ARE A FAILURE OR HAVE LET YOURSELF OR YOUR FAMILY DOWN: NOT AT ALL
9. THOUGHTS THAT YOU WOULD BE BETTER OFF DEAD, OR OF HURTING YOURSELF: NOT AT ALL
4. FEELING TIRED OR HAVING LITTLE ENERGY: NOT AT ALL
10. IF YOU CHECKED OFF ANY PROBLEMS, HOW DIFFICULT HAVE THESE PROBLEMS MADE IT FOR YOU TO DO YOUR WORK, TAKE CARE OF THINGS AT HOME, OR GET ALONG WITH OTHER PEOPLE: NOT DIFFICULT AT ALL
SUM OF ALL RESPONSES TO PHQ9 QUESTIONS 1 & 2: 0
SUM OF ALL RESPONSES TO PHQ QUESTIONS 1-9: 0
1. LITTLE INTEREST OR PLEASURE IN DOING THINGS: NOT AT ALL
5. POOR APPETITE OR OVEREATING: NOT AT ALL
8. MOVING OR SPEAKING SO SLOWLY THAT OTHER PEOPLE COULD HAVE NOTICED. OR THE OPPOSITE, BEING SO FIGETY OR RESTLESS THAT YOU HAVE BEEN MOVING AROUND A LOT MORE THAN USUAL: NOT AT ALL
2. FEELING DOWN, DEPRESSED OR HOPELESS: NOT AT ALL
3. TROUBLE FALLING OR STAYING ASLEEP: NOT AT ALL
SUM OF ALL RESPONSES TO PHQ QUESTIONS 1-9: 0

## 2024-06-05 NOTE — ASSESSMENT & PLAN NOTE
Recently admitted for witnessed generalized tonic-clonic seizure, evaluated by Neurology in the hospital, workup including EEG, MRI brain were normal. Neurology felt that her seizure was likely provoked by COVID, UTI and therefore does not require AEDs    -scheduled to see neurology today for follow-up

## 2024-06-05 NOTE — PROGRESS NOTES
level?: 20 min    Do you eat balanced/healthy meals regularly?: Yes    Body mass index is 30.74 kg/m². (!) Abnormal    Obesity Interventions:  See AVS for additional education material              Vision Screen:  Do you have difficulty driving, watching TV, or doing any of your daily activities because of your eyesight?: No  Have you had an eye exam within the past year?: (!) No  No results found.    Interventions:   Patient encouraged to make appointment with their eye specialist     ADL's:   Patient reports needing help with:  Select all that apply: (!) Dressing, Grooming, Bathing  Select all that apply: (!) Telephone Use, Housekeeping, Banking/Finances, Shopping, Food Preparation, Transportation, Taking Medications  Interventions:  See AVS for additional education material    Advanced Directives:  Do you have a Living Will?: (!) No    Intervention:  has NO advanced directive - information provided                          Objective   Vitals:    06/05/24 1028   BP: 130/70   Site: Left Upper Arm   Position: Sitting   Cuff Size: Medium Adult   Pulse: 71   Temp: 98 °F (36.7 °C)   TempSrc: Temporal   SpO2: 94%   Weight: 70.2 kg (154 lb 12.8 oz)   Height: 1.511 m (4' 11.5\")      Body mass index is 30.74 kg/m².      Physical Exam  Vitals reviewed.   Constitutional:       General: She is not in acute distress.     Appearance: Normal appearance. She is well-developed. She is not ill-appearing, toxic-appearing or diaphoretic.   HENT:      Head: Normocephalic and atraumatic.      Right Ear: Tympanic membrane normal.      Left Ear: Tympanic membrane normal.   Eyes:      General: No scleral icterus.     Extraocular Movements: Extraocular movements intact.      Conjunctiva/sclera: Conjunctivae normal.      Pupils: Pupils are equal, round, and reactive to light.   Cardiovascular:      Rate and Rhythm: Normal rate and regular rhythm.      Heart sounds: Murmur heard.   Pulmonary:      Effort: Pulmonary effort is normal. No

## 2024-06-05 NOTE — ASSESSMENT & PLAN NOTE
-Saw Dr. Fraser in the past  -on fosamax since 07/20, continue fosamax for now   -last dexa 1/22, plan to repeat 01/2025 when will be due for drug holiday

## 2024-06-05 NOTE — ASSESSMENT & PLAN NOTE
Here for annual wellness visit, overall doing well from a clinical standpoint, other for below.  As for health maintenance:  -breast cancer screen: family declined continuing screening mammo   -colon cancer screen: hx of  colon caner s./p resection , last colonoscopy 02/23-  showed moderate diverticulosis of sigmoid colon, s/p right hemicolectomy, no need for further routine surveillance endoscopies  -osteoporosis screen: last DEXA 01/2022, will need repeat 01/2025 before drug holiday  -lung cancer screen: not indicated  -BP within goal  -negative depression and DV screen  -independent of ADLs but gets help with all IADLs  -Advised to eat a healthy, well-balanced diet  -Advised to exercise at least 30min/day 5x/week for heart and bone health  -Check skin regularly for new moles or changes in moles. wear sunscreen at least SPF 30 and don't forget to reapply every 3-4 hours or if you are in the water  -Follow up with dentist at least twice/year.  -Follow up with eye doctor at least once/year.  -Calcium goal is 1200 mg a day ideally from diet (dairy, green leafy vegetables, nuts) , at least 800 units of vitamin D  -Discussed importance of living will, advised to discuss with family and fill out form. Information/packet provided today

## 2024-06-05 NOTE — ASSESSMENT & PLAN NOTE
-last colonoscopy 02/23-  showed moderate diverticulosis of sigmoid colon, s/p right hemicolectomy, no need for further routine surveillance endoscopies  -sees dr suárez, GI  -s/p resection

## 2024-06-05 NOTE — ASSESSMENT & PLAN NOTE
-diagnosed with alzheimer  -sees Dr. Quinteros and NP. Recently recommended starting namenda 5 mg bid, tolerating well  -on prozac 20mg for anxiety, galantamine which we lowered due to kidne function   -On B12 supplement, level sufficient

## 2024-07-05 PROBLEM — Z00.00 MEDICARE ANNUAL WELLNESS VISIT, SUBSEQUENT: Status: RESOLVED | Noted: 2024-06-05 | Resolved: 2024-07-05

## 2024-09-24 RX ORDER — ALBUTEROL SULFATE 90 UG/1
2 INHALANT RESPIRATORY (INHALATION) EVERY 6 HOURS PRN
COMMUNITY
Start: 2024-09-24

## 2024-09-24 RX ORDER — FLUTICASONE PROPIONATE 110 UG/1
2 AEROSOL, METERED RESPIRATORY (INHALATION) 2 TIMES DAILY PRN
COMMUNITY
Start: 2024-09-24

## 2024-09-24 RX ORDER — FLUTICASONE PROPIONATE 50 MCG
1 SPRAY, SUSPENSION (ML) NASAL DAILY PRN
COMMUNITY
Start: 2024-09-24

## 2024-11-27 ENCOUNTER — TELEPHONE (OUTPATIENT)
Dept: INTERNAL MEDICINE CLINIC | Age: 79
End: 2024-11-27

## 2024-11-27 NOTE — TELEPHONE ENCOUNTER
----- Message from Ambrosio DODGE sent at 11/25/2024  1:21 PM EST -----  Regarding: [facility] Referral Request  [facility] Referral Request    Reason for referral request: Lab/Test Order    Specialist/Lab/Test patient is requesting (if known): Lab Work    Specialist Phone Number (if applicable):    Additional Information Patient's daughter called and asking if her mother need to do a Lab work, because has an up coming appointment on 12/05/2024.  --------------------------------------------------------------------------------------------------------------------------    Relationship to Patient: Other Daughter: Kellen     Call Back Information: OK to leave message on voicemail  Preferred Call Back Number: Phone [phone]

## 2024-12-03 ENCOUNTER — TELEPHONE (OUTPATIENT)
Dept: INTERNAL MEDICINE CLINIC | Age: 79
End: 2024-12-03

## 2024-12-03 DIAGNOSIS — E78.00 PURE HYPERCHOLESTEROLEMIA: ICD-10-CM

## 2024-12-03 DIAGNOSIS — R56.9 SEIZURE (HCC): ICD-10-CM

## 2024-12-03 NOTE — TELEPHONE ENCOUNTER
I called to get more information. How did she fall? Did she have an evaluation? I was told that the squad was not called and the nurse on the line did not see her injury.  Her nurse was not available to come to the phone and will call back.

## 2024-12-03 NOTE — TELEPHONE ENCOUNTER
Ira from [facility] states that patient fell today and hit face on the wall in elevator, upper is cut left side and needs pain medication    [facility] - [place], [place] - [address] [place] [address] - P [phone] - f [phone]

## 2024-12-04 LAB
ALBUMIN SERPL-MCNC: 4.3 G/DL (ref 3.4–5)
ALBUMIN/GLOB SERPL: 1.7 {RATIO} (ref 1.1–2.2)
ALP SERPL-CCNC: 44 U/L (ref 40–129)
ALT SERPL-CCNC: 18 U/L (ref 10–40)
ANION GAP SERPL CALCULATED.3IONS-SCNC: 12 MMOL/L (ref 3–16)
AST SERPL-CCNC: 23 U/L (ref 15–37)
BASOPHILS # BLD: 0.1 K/UL (ref 0–0.2)
BASOPHILS NFR BLD: 0.7 %
BILIRUB SERPL-MCNC: <0.2 MG/DL (ref 0–1)
BUN SERPL-MCNC: 14 MG/DL (ref 7–20)
CALCIUM SERPL-MCNC: 9.6 MG/DL (ref 8.3–10.6)
CHLORIDE SERPL-SCNC: 105 MMOL/L (ref 99–110)
CHOLEST SERPL-MCNC: 243 MG/DL (ref 0–199)
CO2 SERPL-SCNC: 27 MMOL/L (ref 21–32)
CREAT SERPL-MCNC: 1 MG/DL (ref 0.6–1.2)
DEPRECATED RDW RBC AUTO: 15.1 % (ref 12.4–15.4)
EOSINOPHIL # BLD: 0.2 K/UL (ref 0–0.6)
EOSINOPHIL NFR BLD: 2 %
GFR SERPLBLD CREATININE-BSD FMLA CKD-EPI: 58 ML/MIN/{1.73_M2}
GLUCOSE SERPL-MCNC: 85 MG/DL (ref 70–99)
HCT VFR BLD AUTO: 42.2 % (ref 36–48)
HDLC SERPL-MCNC: 70 MG/DL (ref 40–60)
HGB BLD-MCNC: 13.9 G/DL (ref 12–16)
LDL CHOLESTEROL: 137 MG/DL
LYMPHOCYTES # BLD: 2.8 K/UL (ref 1–5.1)
LYMPHOCYTES NFR BLD: 25.6 %
MCH RBC QN AUTO: 29.8 PG (ref 26–34)
MCHC RBC AUTO-ENTMCNC: 32.9 G/DL (ref 31–36)
MCV RBC AUTO: 90.5 FL (ref 80–100)
MONOCYTES # BLD: 1.2 K/UL (ref 0–1.3)
MONOCYTES NFR BLD: 11 %
NEUTROPHILS # BLD: 6.6 K/UL (ref 1.7–7.7)
NEUTROPHILS NFR BLD: 60.7 %
PLATELET # BLD AUTO: 407 K/UL (ref 135–450)
PMV BLD AUTO: 7.9 FL (ref 5–10.5)
POTASSIUM SERPL-SCNC: 3.7 MMOL/L (ref 3.5–5.1)
PROT SERPL-MCNC: 6.8 G/DL (ref 6.4–8.2)
RBC # BLD AUTO: 4.67 M/UL (ref 4–5.2)
SODIUM SERPL-SCNC: 144 MMOL/L (ref 136–145)
TRIGL SERPL-MCNC: 181 MG/DL (ref 0–150)
VLDLC SERPL CALC-MCNC: 36 MG/DL
WBC # BLD AUTO: 10.8 K/UL (ref 4–11)

## 2024-12-04 NOTE — TELEPHONE ENCOUNTER
Msg is unclear, if has a cut, might have needed sutures? Concern for fracture? Was it a mechanical fall or did she have symp preceding to fall? I am unable to evaluate/ manage/ treat a fall/ cut/ injury without actually evaluating the pt. Recommend pt to be seen by a provider in her facility/ urgent care/ ED/ here.

## 2024-12-04 NOTE — TELEPHONE ENCOUNTER
I called to try to check on Angely since I cannot get in touch with the nurse at [facility]. LVM for her to call back with an update.

## 2024-12-05 ENCOUNTER — OFFICE VISIT (OUTPATIENT)
Dept: INTERNAL MEDICINE CLINIC | Age: 79
End: 2024-12-05

## 2024-12-05 VITALS
TEMPERATURE: 96.9 F | DIASTOLIC BLOOD PRESSURE: 88 MMHG | OXYGEN SATURATION: 97 % | BODY MASS INDEX: 29.83 KG/M2 | SYSTOLIC BLOOD PRESSURE: 138 MMHG | WEIGHT: 150.2 LBS | HEART RATE: 72 BPM
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
## 2024-12-05 DIAGNOSIS — F02.A4 MILD LATE ONSET ALZHEIMER'S DEMENTIA WITH ANXIETY (HCC): Primary | ICD-10-CM

## 2024-12-05 DIAGNOSIS — I10 ESSENTIAL HYPERTENSION: ICD-10-CM

## 2024-12-05 DIAGNOSIS — G30.1 MILD LATE ONSET ALZHEIMER'S DEMENTIA WITH ANXIETY (HCC): Primary | ICD-10-CM

## 2024-12-05 DIAGNOSIS — N18.31 STAGE 3A CHRONIC KIDNEY DISEASE (CKD) (HCC): ICD-10-CM

## 2024-12-05 RX ORDER — FLUOXETINE 40 MG/1
40 CAPSULE ORAL DAILY
Qty: 90 CAPSULE | Refills: 1 | Status: SHIPPED | OUTPATIENT
Start: 2024-12-05

## 2024-12-05 ASSESSMENT — ENCOUNTER SYMPTOMS: SHORTNESS OF BREATH: 0

## 2024-12-05 NOTE — ASSESSMENT & PLAN NOTE
-Reportedly fell 2 days ago as she was getting out of an elevator, mechanical fall with no preceding symptoms (but history is limited due to dementia).  Noted jaw hematoma today with no tenderness to palpation, no limitation in mouth opening or feeding.  At this point I feel fracture is less likely and we decided to hold on any imaging.  Also in contrary to message yesterday, no clot noted on exam today.  Did note chipped tooth which should be addressed by dentist to avoid traumatic assault from sharp edge.  Return precautions discussed.

## 2024-12-05 NOTE — ASSESSMENT & PLAN NOTE
-diagnosed with alzheimer  -sees Dr. Quinteros and NP. On namenda 5 mg bid, galantamine per their recommendation, tolerating well  -on prozac 20mg for anxiety, today family reports anxiety had been worse, leading to social avoidance.  Will try to increase Prozac to 40 mg  -On B12 supplement, level sufficient

## 2024-12-05 NOTE — PROGRESS NOTES
leg swelling.       PE  Vitals:    12/05/24 1129 12/05/24 1136 12/05/24 1151   BP: (!) 132/92 (!) 140/90 138/88   Pulse: 72     Temp: 96.9 °F (36.1 °C)     SpO2: 97%     Weight: 68.1 kg (150 lb 3.2 oz)       Estimated body mass index is 29.83 kg/m² as calculated from the following:    Height as of 6/5/24: 1.511 m (4' 11.5\").    Weight as of this encounter: 68.1 kg (150 lb 3.2 oz).    Physical Exam  Vitals reviewed.   Constitutional:       General: She is not in acute distress.     Appearance: Normal appearance. She is well-developed. She is not ill-appearing, toxic-appearing or diaphoretic.   HENT:      Head: Normocephalic and atraumatic.   Eyes:      Conjunctiva/sclera: Conjunctivae normal.      Pupils: Pupils are equal, round, and reactive to light.   Cardiovascular:      Rate and Rhythm: Normal rate and regular rhythm.      Heart sounds: Normal heart sounds.   Pulmonary:      Effort: Pulmonary effort is normal. No respiratory distress.      Breath sounds: Normal breath sounds.   Musculoskeletal:      Cervical back: Normal range of motion and neck supple.      Right lower leg: No edema.      Left lower leg: No edema.   Skin:     General: Skin is warm and dry.      Findings: Bruising present.   Neurological:      Mental Status: She is alert.           Michael Sierra MD    This dictation was generated by voice recognition computer software.  Although all attempts are made to edit the dictation for accuracy, there may be errors in the transcription that are not intended.

## 2024-12-16 ENCOUNTER — TELEPHONE (OUTPATIENT)
Dept: INTERNAL MEDICINE CLINIC | Age: 79
End: 2024-12-16

## 2024-12-16 NOTE — TELEPHONE ENCOUNTER
[facility] calling with pt update Pt was hosp 12-14-24 for UTI and possible seizures.       Ph. [phone]

## 2024-12-17 ENCOUNTER — TELEPHONE (OUTPATIENT)
Dept: INTERNAL MEDICINE CLINIC | Age: 79
End: 2024-12-17

## 2024-12-17 NOTE — TELEPHONE ENCOUNTER
Care Transitions Initial Follow Up Call    Outreach made within 2 business days of discharge: Yes    Patient: Angely Brown Patient : 1945   MRN: [id number]  Reason for Admission: UTI and seizures  Discharge Date: 17       Spoke with: Antionette  pt daughter    Discharge department/facility: [facility] Interactive Patient Contact:  Was patient able to fill all prescriptions: Yes  Was patient instructed to bring all medications to the follow-up visit: Yes  Is patient taking all medications as directed in the discharge summary? Yes  Does patient understand their discharge instructions: Yes  Does patient have questions or concerns that need addressed prior to 7-14 day follow up office visit: no    Additional needs identified to be addressed with provider  No needs identified             Scheduled appointment with PCP within 7-14 days-appt 24 at 8am.    Follow Up  Future Appointments   Date Time Provider Department Center   6/10/2025 11:40 AM Michael Sierra MD [facility] JIHAN Lucio

## 2024-12-18 ENCOUNTER — TELEPHONE (OUTPATIENT)
Dept: INTERNAL MEDICINE CLINIC | Age: 79
End: 2024-12-18

## 2024-12-18 NOTE — TELEPHONE ENCOUNTER
Yamilex, nurse, [facility] called to report that the patient's UTI was possibly not treated and patient is confused more than normal. Yamilex wants to know if Dr Sierra will treat it. Explained Dr Sierra may not treat without seeing her. Yamilex wants to know if she should do a UA on her or not.    Please call Yamilex's cell [phone] to advise.

## 2024-12-19 NOTE — TELEPHONE ENCOUNTER
S/W Yamilex BELLO and advised of Dr Sierra's response. She said she was aware of the UTI treatment prior to her discharge. She said that the family said this could be her new normal. RN is asking if Dr Sierra will order urine testing when she comes for her jose ton 12/30/24. She is not having any urinary symptoms right now.

## 2024-12-19 NOTE — TELEPHONE ENCOUNTER
I reviewed admission notes and discharge summary, it appears she was treated with IV antibiotic while admitted, culture ended up being negative for UTI on discharge (no UTI).  If she has any new urinary symptoms needs repeat culture
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
## 2025-01-15 ENCOUNTER — TELEPHONE (OUTPATIENT)
Dept: INTERNAL MEDICINE CLINIC | Age: 80
End: 2025-01-15

## 2025-01-15 NOTE — TELEPHONE ENCOUNTER
Shae from [facility] states that patient has UTI based on color and odor. Wants medication sent to pharmacy    [phone] (Shae)  [facility] - [address] [phone]  f [phone]

## 2025-01-16 NOTE — TELEPHONE ENCOUNTER
Recommend getting a UA with reflex culture, urine color can be darker with dehydration, and odor is not diagnostic of infection (can be influenced by different foods for example)

## 2025-01-24 ENCOUNTER — TELEPHONE (OUTPATIENT)
Dept: INTERNAL MEDICINE CLINIC | Age: 80
End: 2025-01-24

## 2025-01-24 NOTE — TELEPHONE ENCOUNTER
spoke with pt's daughter [facility] have been sending faxes with the pt's urinalysis results. So far we have not received the results.   Called Shae at [facility] to have them resend at 4:00pm still haven't received   Called back and left message at 4:45

## 2025-02-01 ENCOUNTER — OFFICE VISIT (OUTPATIENT)
Dept: URGENT CARE | Age: 80
End: 2025-02-01

## 2025-02-01 VITALS
DIASTOLIC BLOOD PRESSURE: 86 MMHG | OXYGEN SATURATION: 95 % | HEIGHT: 63 IN | WEIGHT: 140 LBS | BODY MASS INDEX: 24.8 KG/M2 | HEART RATE: 68 BPM | TEMPERATURE: 98.6 F | SYSTOLIC BLOOD PRESSURE: 134 MMHG

## 2025-02-01 DIAGNOSIS — R82.90 ABNORMAL FINDING IN URINE: Primary | ICD-10-CM

## 2025-02-01 DIAGNOSIS — N39.9 URINARY PROBLEM IN FEMALE: ICD-10-CM

## 2025-02-01 PROBLEM — S72.002A HIP FRACTURE, LEFT (HCC): Status: ACTIVE | Noted: 2017-02-18

## 2025-02-01 PROBLEM — G93.41 ACUTE METABOLIC ENCEPHALOPATHY: Status: ACTIVE | Noted: 2024-04-16

## 2025-02-01 PROBLEM — E87.20 LACTIC ACIDOSIS: Status: RESOLVED | Noted: 2024-04-16 | Resolved: 2025-02-01

## 2025-02-01 PROBLEM — G40.909 RECURRENT SEIZURES (HCC): Status: ACTIVE | Noted: 2024-12-15

## 2025-02-01 PROBLEM — K92.2 GASTROINTESTINAL HEMORRHAGE: Status: RESOLVED | Noted: 2025-02-01 | Resolved: 2025-02-01

## 2025-02-01 PROBLEM — S72.002A CLOSED FRACTURE OF NECK OF LEFT FEMUR (HCC): Status: ACTIVE | Noted: 2017-02-15

## 2025-02-01 PROBLEM — D75.838 SECONDARY THROMBOCYTOSIS: Status: ACTIVE | Noted: 2025-02-01

## 2025-02-01 PROBLEM — R25.1 TREMOR: Status: ACTIVE | Noted: 2025-02-01

## 2025-02-01 PROBLEM — E78.00 PURE HYPERCHOLESTEROLEMIA: Status: ACTIVE | Noted: 2018-03-21

## 2025-02-01 PROBLEM — R94.31 LONG QT INTERVAL: Status: ACTIVE | Noted: 2024-12-15

## 2025-02-01 LAB
APPEARANCE FLUID: ABNORMAL
BILIRUBIN, POC: ABNORMAL
BLOOD URINE, POC: ABNORMAL
CLARITY, POC: ABNORMAL
COLOR, POC: ABNORMAL
GLUCOSE URINE, POC: ABNORMAL MG/DL
KETONES, POC: 15 MG/DL
LEUKOCYTE EST, POC: ABNORMAL
NITRITE, POC: ABNORMAL
PH, POC: 6
PROTEIN, POC: ABNORMAL MG/DL
SPECIFIC GRAVITY, POC: 1.02
UROBILINOGEN, POC: ABNORMAL

## 2025-02-01 RX ORDER — DIVALPROEX SODIUM 250 MG/1
250 TABLET, DELAYED RELEASE ORAL 2 TIMES DAILY
COMMUNITY
Start: 2024-12-17

## 2025-02-01 ASSESSMENT — ENCOUNTER SYMPTOMS
VOMITING: 0
DIARRHEA: 0
NAUSEA: 0
SHORTNESS OF BREATH: 0
BACK PAIN: 0

## 2025-02-01 ASSESSMENT — VISUAL ACUITY: OU: 1

## 2025-02-01 NOTE — PATIENT INSTRUCTIONS
Results for orders placed or performed in visit on 02/01/25   POCT Urinalysis no Micro   Result Value Ref Range    Color, UA dark yellow     Clarity, UA slightly cloudy     Glucose, UA POC NEG mg/dL    Bilirubin, UA NEG     Ketones, UA 15 mg/dL    Spec Grav, UA 1.025     Blood, UA POC NEG     pH, UA 6     Protein, UA POC TRACE mg/dL    Urobilinogen, UA      Leukocytes, UA SMALL     Nitrite, UA NEG     Appearance, Fluid Slightly Cloudy Clear, Slightly Cloudy       Urine culture sent to confirm, to identify pathogen, and to clarify sensitivities.   Will augment treatment plan as necessary pending culture results.  In clinic UA is of low concerns for UTI at this time. Will wait for culture results before treating with antibiotics.  Recommend good hydration.  Can take Acetaminophen (Tylenol) for any pain symptoms.    Follow up with your PCP within 5 days or, if unable, you can return to the clinic if symptoms persist beyond 5 days or if symptoms worsen.    If fevers, shivering, nausea, vomiting, shortness of breath, chest pain, if severe abdominal or back pain develops, or if symptoms continue to worsen despite treatment plan, follow up with the ER for further evaluation.

## 2025-02-01 NOTE — PROGRESS NOTES
Tenderness: There is no abdominal tenderness. There is no right CVA tenderness, left CVA tenderness, guarding or rebound. Negative signs include Vicente's sign, Rovsing's sign and McBurney's sign.   Genitourinary:     Comments: Pt defers exam  Musculoskeletal:      Cervical back: Full passive range of motion without pain, normal range of motion and neck supple. No rigidity. Normal range of motion.   Skin:     General: Skin is warm and dry.   Neurological:      Mental Status: She is alert and oriented to person, place, and time.   Psychiatric:         Attention and Perception: Attention normal.         Speech: Speech is tangential.         Behavior: Behavior is cooperative.         Cognition and Memory: Cognition is impaired. Memory is impaired.         PROCEDURES:  Unless otherwise noted below, none     Procedures    RESULTS:  Results for orders placed or performed in visit on 02/01/25   POCT Urinalysis no Micro   Result Value Ref Range    Color, UA dark yellow     Clarity, UA slightly cloudy     Glucose, UA POC NEG mg/dL    Bilirubin, UA NEG     Ketones, UA 15 mg/dL    Spec Grav, UA 1.025     Blood, UA POC NEG     pH, UA 6     Protein, UA POC TRACE mg/dL    Urobilinogen, UA      Leukocytes, UA SMALL     Nitrite, UA NEG     Appearance, Fluid Slightly Cloudy Clear, Slightly Cloudy         An electronic signature was used to authenticate this note.  --Nba Elizabeth PA-C      This chart was generated in part by using Dragon Dictation system and may contain errors related to that system including errors in grammar, punctuation, and spelling, as well as words and phrases that may be inappropriate. If there are any questions or concerns please feel free to contact the dictating provider for clarification.

## 2025-02-03 LAB — BACTERIA UR CULT: NORMAL
